# Patient Record
Sex: FEMALE | Race: WHITE | Employment: FULL TIME | URBAN - METROPOLITAN AREA
[De-identification: names, ages, dates, MRNs, and addresses within clinical notes are randomized per-mention and may not be internally consistent; named-entity substitution may affect disease eponyms.]

---

## 2017-01-09 ENCOUNTER — OFFICE VISIT (OUTPATIENT)
Dept: FAMILY MEDICINE | Facility: CLINIC | Age: 32
End: 2017-01-09
Payer: COMMERCIAL

## 2017-01-09 VITALS
TEMPERATURE: 99 F | DIASTOLIC BLOOD PRESSURE: 70 MMHG | SYSTOLIC BLOOD PRESSURE: 113 MMHG | OXYGEN SATURATION: 97 % | WEIGHT: 115.5 LBS | BODY MASS INDEX: 22.68 KG/M2 | HEART RATE: 67 BPM | HEIGHT: 60 IN

## 2017-01-09 DIAGNOSIS — N92.6 MISSED PERIOD: Primary | ICD-10-CM

## 2017-01-09 DIAGNOSIS — Z32.01 PREGNANCY TEST POSITIVE: ICD-10-CM

## 2017-01-09 DIAGNOSIS — Z30.09 ENCOUNTER FOR OTHER GENERAL COUNSELING OR ADVICE ON CONTRACEPTION: ICD-10-CM

## 2017-01-09 LAB — BETA HCG QUAL IFA URINE: POSITIVE

## 2017-01-09 PROCEDURE — 99213 OFFICE O/P EST LOW 20 MIN: CPT | Performed by: FAMILY MEDICINE

## 2017-01-09 PROCEDURE — 84703 CHORIONIC GONADOTROPIN ASSAY: CPT | Performed by: FAMILY MEDICINE

## 2017-01-09 ASSESSMENT — PAIN SCALES - GENERAL: PAINLEVEL: NO PAIN (0)

## 2017-01-09 NOTE — MR AVS SNAPSHOT
After Visit Summary   2017    Jenny Caldwell    MRN: [id number]           Patient Information     Date Of Birth          1985        Visit Information        Provider Department      2017 6:15 PM Alicia Bonilla MD [facility]        Today's Diagnoses     Missed period    -  1     Pregnancy test positive           Care Instructions     [facility] Offering  Services   Clinic     Gestation  Days Open   [facility]   4-22 weeks  Mon-Sat   [address] [place] [address]   [phone]     [facility]  5-21 weeks  Mon-Fri   [address], [address]   [place], [place] [address] [phone]     [facility]   6-12 weeks  Mon-Sat   [address] [place], [place] [address] [phone]     Dr. Maria D Tellez   5-21 weeks  Mon-Fri   [address] [facility] [address] [address]   [phone]     - Some insurance plans cover the cost of  services   - Fees vary with each clinic and the stage of pregnancy.   - At each of the above clinics, you will visit with a nurse or counselor before seeing the physician for the  procedure. They will review your health history, explain the procedure, aftercare, and the  consent form, and discuss any questions or concerns you may have about your decision, the procedure, and future contraception.   - A loan fund for women in need of financial services is available through [employer]. If you are eligible to receive money you will be expected to repay the loan within a reasonable amount of time. Financial Assistance Phone: [phone]     Pregnancy can be accompanied by a range of feelings for a woman as well as her partner. There are community organizations where a woman or couple can talk about this and get information, counseling, and support. It usually involves one or more sessions with a trained counselor who recognizes the complexity of a  situation and encourages your own decisions.     For questions about community resources in terms of your pregnancy you may call [facility] for a pregnancy counseling referral [phone]     Pregnancy and adoption information can be found at the following:   Children [employer] Home [employer]   [facility]   [address] [place]   [address] [facility]   [address] [facility]  Phone: [phone]   Phone: [phone]   Phone: [phone]         Follow-ups after your visit        Who to contact     If you have questions or need follow up information about today's clinic visit or your schedule please contact [facility] directly at [phone].  Normal or non-critical lab and imaging results will be communicated to you by MyChart, letter or phone within 4 business days after the clinic has received the results. If you do not hear from us within 7 days, please contact the clinic through MyChart or phone. If you have a critical or abnormal lab result, we will notify you by phone as soon as possible.  Submit refill requests through [employer] or call your pharmacy and they will forward the refill request to us. Please allow 3 business days for your refill to be completed.          Additional Information About Your Visit        CellScopehart Information     [employer] gives you secure access to your electronic health record. If you see a primary care provider, you can also send messages to your care team and make appointments. If you have questions, please call your primary care clinic.  If you do not have a primary care provider, please call [phone] and they will assist you.        Care EveryWhere ID     This is your Care EveryWhere ID. This could be used by other organizations to access your [place] medical records  [id number]        Your Vitals Were     Pulse Temperature Height BMI (Body Mass Index) Pulse Oximetry Last Period    67 99  F (37.2  " C) (Oral) 4' 11.84\" (1.52 m) 22.68 kg/m2 97% (LMP Unknown)       Blood Pressure from Last 3 Encounters:   01/09/17 113/70   10/20/16 98/80   06/22/16 112/79    Weight from Last 3 Encounters:   01/09/17 115 lb 8 oz (52.39 kg)   06/22/16 117 lb (53.071 kg)   06/06/16 117 lb (53.071 kg)              We Performed the Following     Beta HCG qual IFA urine        Primary Care Provider Office Phone # Fax #    Alicia Bonilla [phone]       [address] [address]        Thank you!     Thank you for choosing [facility]  for your care. Our goal is always to provide you with excellent care. Hearing back from our patients is one way we can continue to improve our services. Please take a few minutes to complete the written survey that you may receive in the mail after your visit with us. Thank you!             Your Updated Medication List - Protect others around you: Learn how to safely use, store and throw away your medicines at www.disposemymeds.org.          This list is accurate as of: 1/9/17  6:40 PM.  Always use your most recent med list.                   Brand Name Dispense Instructions for use    guaiFENesin-codeine 100-10 MG/5ML Soln solution    ROBITUSSIN AC    120 mL    Take 5 mLs by mouth every 4 hours as needed for cough       levonorgestrel-ethinyl estradiol 0.15-30 MG-MCG per tablet    DANY-28    84 tablet    Take 1 tablet by mouth daily         "

## 2017-01-10 NOTE — PATIENT INSTRUCTIONS
[facility] Offering  Services   Clinic     Gestation  Days Open   [facility]   4-22 weeks  Mon-Sat   [address] [place] [address]   [phone]     [facility]  5-21 weeks  Mon-Fri   [address]   [place] [address]   [phone]     [facility]   6-12 weeks  Mon-Sat   [address] [place] [address]   [phone]     Dr. Maria D Tellez   5-21 weeks  Mon-Fri   [address] [address]   [phone]     - Some insurance plans cover the cost of  services   - Fees vary with each clinic and the stage of pregnancy.   - At each of the above clinics, you will visit with a nurse or counselor before seeing the physician for the  procedure. They will review your health history, explain the procedure, aftercare, and the  consent form, and discuss any questions or concerns you may have about your decision, the procedure, and future contraception.   - A loan fund for women in need of financial services is available through [employer]. If you are eligible to receive money you will be expected to repay the loan within a reasonable amount of time. Financial Assistance Phone: [phone]     Pregnancy can be accompanied by a range of feelings for a woman as well as her partner. There are community organizations where a woman or couple can talk about this and get information, counseling, and support. It usually involves one or more sessions with a trained counselor who recognizes the complexity of a situation and encourages your own decisions.     For questions about community resources in terms of your pregnancy you may call [facility] for a pregnancy counseling referral [phone]     Pregnancy and adoption information can be found at the following:   Children s Home Society   [facility]    [facility]   [address] [place]    [address] [facility]   [address]  Phone: [phone]   Phone: [phone]   Phone: [phone]
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
## 2017-01-10 NOTE — NURSING NOTE
"Chief Complaint   Patient presents with     Confirmation Of Pregnancy       Initial /70 mmHg  Pulse 67  Temp(Src) 99  F (37.2  C) (Oral)  Ht 4' 11.84\" (1.52 m)  Wt 115 lb 8 oz (52.39 kg)  BMI 22.68 kg/m2  SpO2 97% Estimated body mass index is 22.68 kg/(m^2) as calculated from the following:    Height as of this encounter: 4' 11.84\" (1.52 m).    Weight as of this encounter: 115 lb 8 oz (52.39 kg).  BP completed using cuff size: regular    "

## 2017-04-17 ENCOUNTER — MYC MEDICAL ADVICE (OUTPATIENT)
Dept: FAMILY MEDICINE | Facility: CLINIC | Age: 32
End: 2017-04-17

## 2017-04-18 ENCOUNTER — MYC MEDICAL ADVICE (OUTPATIENT)
Dept: FAMILY MEDICINE | Facility: CLINIC | Age: 32
End: 2017-04-18

## 2017-04-19 NOTE — TELEPHONE ENCOUNTER
Spoke with patient she is having abdominal pain that comes and goes, denies dizziness.  Offered appointment with alternative provider.  Patient states she was told by PCP that she could be fit into PCP schedule when needed.  Advised patient provider is out this week, patient aware and would like to wait.   Lelia Rowland RN

## 2017-04-24 ENCOUNTER — OFFICE VISIT (OUTPATIENT)
Dept: FAMILY MEDICINE | Facility: CLINIC | Age: 32
End: 2017-04-24
Payer: COMMERCIAL

## 2017-04-24 VITALS
WEIGHT: 118.5 LBS | SYSTOLIC BLOOD PRESSURE: 109 MMHG | DIASTOLIC BLOOD PRESSURE: 73 MMHG | BODY MASS INDEX: 23.26 KG/M2 | HEART RATE: 74 BPM | TEMPERATURE: 98.5 F | OXYGEN SATURATION: 99 % | HEIGHT: 60 IN

## 2017-04-24 DIAGNOSIS — F43.22 ADJUSTMENT DISORDER WITH ANXIOUS MOOD: ICD-10-CM

## 2017-04-24 DIAGNOSIS — R10.84 ABDOMINAL PAIN, GENERALIZED: Primary | ICD-10-CM

## 2017-04-24 DIAGNOSIS — K59.00 CONSTIPATION, UNSPECIFIED CONSTIPATION TYPE: ICD-10-CM

## 2017-04-24 DIAGNOSIS — Z63.79 STRESSFUL LIFE EVENT AFFECTING FAMILY: ICD-10-CM

## 2017-04-24 LAB
BASOPHILS # BLD AUTO: 0 10E9/L (ref 0–0.2)
BASOPHILS NFR BLD AUTO: 0.3 %
DIFFERENTIAL METHOD BLD: NORMAL
EOSINOPHIL # BLD AUTO: 0.1 10E9/L (ref 0–0.7)
EOSINOPHIL NFR BLD AUTO: 0.9 %
ERYTHROCYTE [DISTWIDTH] IN BLOOD BY AUTOMATED COUNT: 12.4 % (ref 10–15)
ERYTHROCYTE [SEDIMENTATION RATE] IN BLOOD BY WESTERGREN METHOD: 9 MM/H (ref 0–20)
HCT VFR BLD AUTO: 42.1 % (ref 35–47)
HGB BLD-MCNC: 13.8 G/DL (ref 11.7–15.7)
LYMPHOCYTES # BLD AUTO: 2.7 10E9/L (ref 0.8–5.3)
LYMPHOCYTES NFR BLD AUTO: 41.6 %
MCH RBC QN AUTO: 32.2 PG (ref 26.5–33)
MCHC RBC AUTO-ENTMCNC: 32.8 G/DL (ref 31.5–36.5)
MCV RBC AUTO: 98 FL (ref 78–100)
MONOCYTES # BLD AUTO: 0.7 10E9/L (ref 0–1.3)
MONOCYTES NFR BLD AUTO: 11.1 %
NEUTROPHILS # BLD AUTO: 3 10E9/L (ref 1.6–8.3)
NEUTROPHILS NFR BLD AUTO: 46.1 %
PLATELET # BLD AUTO: 384 10E9/L (ref 150–450)
RBC # BLD AUTO: 4.28 10E12/L (ref 3.8–5.2)
WBC # BLD AUTO: 6.6 10E9/L (ref 4–11)

## 2017-04-24 PROCEDURE — 86140 C-REACTIVE PROTEIN: CPT | Performed by: FAMILY MEDICINE

## 2017-04-24 PROCEDURE — 99214 OFFICE O/P EST MOD 30 MIN: CPT | Performed by: FAMILY MEDICINE

## 2017-04-24 PROCEDURE — 85025 COMPLETE CBC W/AUTO DIFF WBC: CPT | Performed by: FAMILY MEDICINE

## 2017-04-24 PROCEDURE — 36415 COLL VENOUS BLD VENIPUNCTURE: CPT | Performed by: FAMILY MEDICINE

## 2017-04-24 PROCEDURE — 85652 RBC SED RATE AUTOMATED: CPT | Performed by: FAMILY MEDICINE

## 2017-04-24 PROCEDURE — 80053 COMPREHEN METABOLIC PANEL: CPT | Performed by: FAMILY MEDICINE

## 2017-04-24 PROCEDURE — 83690 ASSAY OF LIPASE: CPT | Performed by: FAMILY MEDICINE

## 2017-04-24 NOTE — NURSING NOTE
Chief Complaint   Patient presents with     Abdominal Pain       Initial /73  Pulse 74  Temp 98.5  F (36.9  C) (Oral)  Ht 5' (1.524 m)  Wt 118 lb 8 oz (53.8 kg)  LMP  (LMP Unknown)  SpO2 99%  BMI 23.14 kg/m2 Estimated body mass index is 23.14 kg/(m^2) as calculated from the following:    Height as of this encounter: 5' (1.524 m).    Weight as of this encounter: 118 lb 8 oz (53.8 kg).  Medication Reconciliation: complete       Awilda Steinberg, CMA

## 2017-04-24 NOTE — TELEPHONE ENCOUNTER
Called patient put her on Dr. Bonilla's schedule for today at 5:45 there was a cancellation.  Eileen Brown,

## 2017-04-24 NOTE — PATIENT INSTRUCTIONS
Call to schedule with the counselor  The  will call you    Plan: Magnesium Citrate 150ml or 1/2 bottle twice a day for 3 days.  MiraLax one capful in 8 ounces of water twice a day for 3 days and then MiraLax one capful in 8 ounces of water everyday in the evening or AM.  Keep stools soft and easy to pass  Can adjust MiraLax to 3/4 capful in 6 ounces of water or 1/2 capful in 4 ounces of water.or increase the Miralax to 2 times a day for a few days.    continue MiraLax daily until stools are soft and easy to pass for 2 months and then can wean off.  Go to every other day dosing for a couple of weeks then 2 doses per week for a couple of weeks and stop.   But if stools get hard again then continue on the MiraLax until stools are soft and easy to passand then wean off.  Needs to drink 64 ounces of fluids per day and 16 ounces only of these can be from milk.  Add 2 gummi fiber per day   [facility]    If you have any questions regarding to your visit please contact your care team:       Team Purple:   Clinic Hours Telephone Number   [address] Dr[address]   7am-7pm  Monday - Thursday   7am-5pm  Fridays  [phone]  (Appointment scheduling available 24/7)    Questions about your Visit?   Team Line:  [phone]   Urgent Care - [place] - 11am-9pm Monday-Friday Saturday-Sunday- 9am-5pm   [place] - 5pm-9pm Monday-Friday Saturday-Sunday- 9am-5pm  [phone] - [place]   [phone] - [place]       What options do I have for visits at the clinic other than the traditional office visit?  To expand how we care for you, many of our providers are utilizing electronic visits (e-visits) and telephone visits, when medically appropriate, for interactions with their patients rather than a visit in the clinic.   We also offer nurse visits for many medical concerns. Just like any other service, we will bill your  insurance company for this type of visit based on time spent on the phone with your provider. Not all insurance companies cover these visits. Please check with your medical insurance if this type of visit is covered. You will be responsible for any charges that are not paid by your insurance.      E-visits via [employer]:  generally incur a $35.00 fee.  Telephone visits:  Time spent on the phone: *charged based on time that is spent on the phone in increments of 10 minutes. Estimated cost:   5-10 mins $30.00   11-20 mins. $59.00   21-30 mins. $85.00     Use Swidjit (secure email communication and access to your chart) to send your primary care provider a message or make an appointment. Ask someone on your Team how to sign up for Swidjit.  For a Price Quote for your services, please call our Consumer Price Line at [phone].  As always, Thank you for trusting us with your health care needs!    Liliya Bryant MA
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
## 2017-04-24 NOTE — MR AVS SNAPSHOT
After Visit Summary   4/24/2017    Jenny Caldwell    MRN: [id number]           Patient Information     Date Of Birth          1985        Visit Information        Provider Department      4/24/2017 5:45 PM Alicia Bonilla MD [facility]        Today's Diagnoses     Abdominal pain, generalized    -  1    Adjustment disorder with anxious mood        Stressful life event affecting family        Constipation, unspecified constipation type          Care Instructions    Call to schedule with the counselor  The  will call you    Plan: Magnesium Citrate 150ml or 1/2 bottle twice a day for 3 days.  MiraLax one capful in 8 ounces of water twice a day for 3 days and then MiraLax one capful in 8 ounces of water everyday in the evening or AM.  Keep stools soft and easy to pass  Can adjust MiraLax to 3/4 capful in 6 ounces of water or 1/2 capful in 4 ounces of water.or increase the Miralax to 2 times a day for a few days.    continue MiraLax daily until stools are soft and easy to pass for 2 months and then can wean off.  Go to every other day dosing for a couple of weeks then 2 doses per week for a couple of weeks and stop.   But if stools get hard again then continue on the MiraLax until stools are soft and easy to passand then wean off.  Needs to drink 64 ounces of fluids per day and 16 ounces only of these can be from milk.  Add 2 gummi fiber per day   [facility]    If you have any questions regarding to your visit please contact your care team:       Team Purple:   Clinic Hours Telephone Number   [address] Dr.   7am-7pm  Monday - Thursday   7am-5pm  Fridays  [phone]  (Appointment scheduling available 24/7)    Questions about your Visit?   Team Line:  [phone]   Urgent Care - [place] - 11am-9pm Monday-Friday Saturday-Sunday- 9am-5pm   [place] - 5pm-9pm  Monday-Friday Saturday-Sunday- 9am-5pm  [phone] - [place]   [phone] - [place]       What options do I have for visits at the clinic other than the traditional office visit?  To expand how we care for you, many of our providers are utilizing electronic visits (e-visits) and telephone visits, when medically appropriate, for interactions with their patients rather than a visit in the clinic.   We also offer nurse visits for many medical concerns. Just like any other service, we will bill your insurance company for this type of visit based on time spent on the phone with your provider. Not all insurance companies cover these visits. Please check with your medical insurance if this type of visit is covered. You will be responsible for any charges that are not paid by your insurance.      E-visits via O[employer]X:  generally incur a $35.00 fee.  Telephone visits:  Time spent on the phone: *charged based on time that is spent on the phone in increments of 10 minutes. Estimated cost:   5-10 mins $30.00   11-20 mins. $59.00   21-30 mins. $85.00     Use OSIX (secure email communication and access to your chart) to send your primary care provider a message or make an appointment. Ask someone on your Team how to sign up for [employer]SIX.  For a Price Quote for your services, please call our Consumer Price Line at [phone].  As always, Thank you for trusting us with your health care needs!    Liliya Bryant MA          Follow-ups after your visit        Additional Services     CARE COORDINATION REFERRAL       Services are provided by a Care Coordinator for people with complex needs such as: medical, social, or financial troubles.  The Care Coordinator works with the patient and their Primary Care Provider to determine health goals, obtain resources, achieve outcomes, and develop care plans that help coordinate the patient's care.     Reason for Referral: Mental Status/Behavioral Changes and Other: custody and child  support issues    Provide additional details for Care Coordination to best meet the patient's current needs: will discuss with Leroy tomorrow    Clinical Staff have discussed the Care Coordination Referral with the patient and/or caregiver: yes            MENTAL HEALTH REFERRAL       Your provider has referred you to: MUSHTAQ: [facility] - [facility]ing (Individual/Couples/Family) - [facility], [place] [phone]   [email or web address]/   *Patient will be contacted by [place]'s scheduling partner, [facility]), to schedule an appointment.  Patients may also call [facility] to schedule.    All scheduling is subject to the client's specific insurance plan & benefits, provider/location availability, and provider clinical specialities.  Please arrive 15 minutes early for your first appointment and bring your completed paperwork.    Please be aware that coverage of these services is subject to the terms and limitations of your health insurance plan.  Call member services at your health plan with any benefit or coverage questions.                  Future tests that were ordered for you today     Open Future Orders        Priority Expected Expires Ordered    H Pylori antigen, stool Routine  5/24/2017 4/24/2017            Who to contact     If you have questions or need follow up information about today's clinic visit or your schedule please contact [facility] directly at [phone].  Normal or non-critical lab and imaging results will be communicated to you by MyChart, letter or phone within 4 business days after the clinic has received the results. If you do not hear from us within 7 days, please contact the clinic through MyChart or phone. If you have a critical or abnormal lab result, we will notify you by phone as soon as possible.  Submit refill requests through [email or web address] or call your pharmacy and they will forward  the refill request to us. Please allow 3 business days for your refill to be completed.          Additional Information About Your Visit        [employer] Information     [employer] gives you secure access to your electronic health record. If you see a primary care provider, you can also send messages to your care team and make appointments. If you have questions, please call your primary care clinic.  If you do not have a primary care provider, please call [phone] and they will assist you.        Care EveryWhere ID     This is your Care EveryWhere ID. This could be used by other organizations to access your [place] medical records  [id number]        Your Vitals Were     Pulse Temperature Height Last Period Pulse Oximetry BMI (Body Mass Index)    74 98.5  F (36.9  C) (Oral) 5' (1.524 m) (LMP Unknown) 99% 23.14 kg/m2       Blood Pressure from Last 3 Encounters:   04/24/17 109/73   01/09/17 113/70   10/20/16 98/80    Weight from Last 3 Encounters:   04/24/17 118 lb 8 oz (53.8 kg)   01/09/17 115 lb 8 oz (52.4 kg)   06/22/16 117 lb (53.1 kg)              We Performed the Following     CARE COORDINATION REFERRAL     CBC with platelets and differential     Comprehensive metabolic panel     CRP, inflammation     ESR: Erythrocyte sedimentation rate     Lipase     MENTAL HEALTH REFERRAL        Primary Care Provider Office Phone # Fax #    Alicia Bonilla [phone]       [address] [address]        Thank you!     Thank you for choosing [facility]  for your care. Our goal is always to provide you with excellent care. Hearing back from our patients is one way we can continue to improve our services. Please take a few minutes to complete the written survey that you may receive in the mail after your visit with us. Thank you!             Your Updated Medication List - Protect others around you: Learn how to safely use, store and throw away your  medicines at www.disposemymeds.org.          This list is accurate as of: 4/24/17  6:42 PM.  Always use your most recent med list.                   Brand Name Dispense Instructions for use    guaiFENesin-codeine 100-10 MG/5ML Soln solution    ROBITUSSIN AC    120 mL    Take 5 mLs by mouth every 4 hours as needed for cough       levonorgestrel-ethinyl estradiol 0.15-30 MG-MCG per tablet    DANY-28    84 tablet    Take 1 tablet by mouth daily

## 2017-04-25 DIAGNOSIS — R10.84 ABDOMINAL PAIN, GENERALIZED: ICD-10-CM

## 2017-04-25 LAB
ALBUMIN SERPL-MCNC: 4.1 G/DL (ref 3.4–5)
ALP SERPL-CCNC: 62 U/L (ref 40–150)
ALT SERPL W P-5'-P-CCNC: 18 U/L (ref 0–50)
ANION GAP SERPL CALCULATED.3IONS-SCNC: 6 MMOL/L (ref 3–14)
AST SERPL W P-5'-P-CCNC: 15 U/L (ref 0–45)
BILIRUB SERPL-MCNC: 0.4 MG/DL (ref 0.2–1.3)
BUN SERPL-MCNC: 13 MG/DL (ref 7–30)
CALCIUM SERPL-MCNC: 9.4 MG/DL (ref 8.5–10.1)
CHLORIDE SERPL-SCNC: 109 MMOL/L (ref 94–109)
CO2 SERPL-SCNC: 26 MMOL/L (ref 20–32)
CREAT SERPL-MCNC: 0.62 MG/DL (ref 0.52–1.04)
CRP SERPL-MCNC: <2.9 MG/L (ref 0–8)
GFR SERPL CREATININE-BSD FRML MDRD: NORMAL ML/MIN/1.7M2
GLUCOSE SERPL-MCNC: 85 MG/DL (ref 70–99)
LIPASE SERPL-CCNC: 193 U/L (ref 73–393)
POTASSIUM SERPL-SCNC: 4.2 MMOL/L (ref 3.4–5.3)
PROT SERPL-MCNC: 7.5 G/DL (ref 6.8–8.8)
SODIUM SERPL-SCNC: 141 MMOL/L (ref 133–144)

## 2017-04-25 PROCEDURE — 87338 HPYLORI STOOL AG IA: CPT | Performed by: FAMILY MEDICINE

## 2017-04-26 LAB
H PYLORI AG STL QL IA: NORMAL
MICRO REPORT STATUS: NORMAL
SPECIMEN SOURCE: NORMAL

## 2017-05-02 ENCOUNTER — CARE COORDINATION (OUTPATIENT)
Dept: CARE COORDINATION | Facility: CLINIC | Age: 32
End: 2017-05-02

## 2017-05-02 NOTE — PROGRESS NOTES
Clinic Care Coordination Contact  Care Team Conversations    SW followed up with pt referral placed by PCP. Pt described feeling overwhelmed due father of her children initiating a custody order. Pt has legal resources and is pursuing them in order to assist with her custody issue.   Pt currently declining CC as she feels she has sufficient support for her legal concerns.    Pt will contact CC if other needs arise. SW will not schedule any future outreach.    YANY Padilla  Care Coordination  [place]  [phone]  [email or web address]

## 2017-09-26 NOTE — PATIENT INSTRUCTIONS
Preventive Health Recommendations  Female Ages 26 - 39  Yearly exam:   See your health care provider every year in order to    Review health changes.     Discuss preventive care.      Review your medicines if you your doctor has prescribed any.    Until age 30: Get a Pap test every three years (more often if you have had an abnormal result).    After age 30: Talk to your doctor about whether you should have a Pap test every 3 years or have a Pap test with HPV screening every 5 years.   You do not need a Pap test if your uterus was removed (hysterectomy) and you have not had cancer.  You should be tested each year for STDs (sexually transmitted diseases), if you're at risk.   Talk to your provider about how often to have your cholesterol checked.  If you are at risk for diabetes, you should have a diabetes test (fasting glucose).  Shots: Get a flu shot each year. Get a tetanus shot every 10 years.   Nutrition:     Eat at least 5 servings of fruits and vegetables each day.    Eat whole-grain bread, whole-wheat pasta and brown rice instead of white grains and rice.    Talk to your provider about Calcium and Vitamin D.     Lifestyle    Exercise at least 150 minutes a week (30 minutes a day, 5 days of the week). This will help you control your weight and prevent disease.    Limit alcohol to one drink per day.    No smoking.     Wear sunscreen to prevent skin cancer.    See your dentist every six months for an exam and cleaning.    [facility]    If you have any questions regarding to your visit please contact your care team:       Team Purple:   Clinic Hours Telephone Number   Dr. Leslie Bonilla     7am-7pm  Monday - Thursday   7am-5pm  Fridays  [phone]  (Appointment scheduling available 24/7)    Questions about your Visit?   Team Line:  [phone]   Urgent Care - [place] and [place] - 11am-9pm Monday-Friday Saturday-Sunday- 9am-5pm    [place] - 5pm-9pm Monday-Friday Saturday-Sunday- 9am-5pm  [phone] - [place]   [phone] - [place]       What options do I have for visits at the clinic other than the traditional office visit?  To expand how we care for you, many of our providers are utilizing electronic visits (e-visits) and telephone visits, when medically appropriate, for interactions with their patients rather than a visit in the clinic.   We also offer nurse visits for many medical concerns. Just like any other service, we will bill your insurance company for this type of visit based on time spent on the phone with your provider. Not all insurance companies cover these visits. Please check with your medical insurance if this type of visit is covered. You will be responsible for any charges that are not paid by your insurance.      E-visits via "[employer]":  generally incur a $35.00 fee.  Telephone visits:  Time spent on the phone: *charged based on time that is spent on the phone in increments of 10 minutes. Estimated cost:   5-10 mins $30.00   11-20 mins. $59.00   21-30 mins. $85.00     Use "[employer]" (secure email communication and access to your chart) to send your primary care provider a message or make an appointment. Ask someone on your Team how to sign up for "[employer]  For a Price Quote for your services, please call our Consumer Price Line at [phone].  As always, Thank you for trusting us with your health care needs!

## 2017-09-27 ENCOUNTER — OFFICE VISIT (OUTPATIENT)
Dept: FAMILY MEDICINE | Facility: CLINIC | Age: 32
End: 2017-09-27
Payer: MEDICAID

## 2017-09-27 VITALS
WEIGHT: 119.5 LBS | BODY MASS INDEX: 23.46 KG/M2 | SYSTOLIC BLOOD PRESSURE: 104 MMHG | TEMPERATURE: 99 F | HEART RATE: 83 BPM | DIASTOLIC BLOOD PRESSURE: 68 MMHG | OXYGEN SATURATION: 99 % | HEIGHT: 60 IN

## 2017-09-27 DIAGNOSIS — Z12.4 SCREENING FOR MALIGNANT NEOPLASM OF CERVIX: ICD-10-CM

## 2017-09-27 DIAGNOSIS — Z90.81 S/P SPLENECTOMY: ICD-10-CM

## 2017-09-27 DIAGNOSIS — Z23 NEED FOR PROPHYLACTIC VACCINATION AND INOCULATION AGAINST INFLUENZA: ICD-10-CM

## 2017-09-27 DIAGNOSIS — Z30.09 STERILIZATION CONSULT: ICD-10-CM

## 2017-09-27 DIAGNOSIS — Z80.3 FAMILY HISTORY OF BREAST CANCER: ICD-10-CM

## 2017-09-27 DIAGNOSIS — Z00.00 ENCOUNTER FOR ROUTINE ADULT HEALTH EXAMINATION WITHOUT ABNORMAL FINDINGS: Primary | ICD-10-CM

## 2017-09-27 DIAGNOSIS — Z30.41 SURVEILLANCE OF PREVIOUSLY PRESCRIBED CONTRACEPTIVE PILL: ICD-10-CM

## 2017-09-27 DIAGNOSIS — Z12.31 ENCOUNTER FOR SCREENING MAMMOGRAM FOR BREAST CANCER: ICD-10-CM

## 2017-09-27 PROCEDURE — 90734 MENACWYD/MENACWYCRM VACC IM: CPT | Performed by: FAMILY MEDICINE

## 2017-09-27 PROCEDURE — 90686 IIV4 VACC NO PRSV 0.5 ML IM: CPT | Performed by: FAMILY MEDICINE

## 2017-09-27 PROCEDURE — 99395 PREV VISIT EST AGE 18-39: CPT | Mod: 25 | Performed by: FAMILY MEDICINE

## 2017-09-27 PROCEDURE — G0476 HPV COMBO ASSAY CA SCREEN: HCPCS | Performed by: FAMILY MEDICINE

## 2017-09-27 PROCEDURE — 90472 IMMUNIZATION ADMIN EACH ADD: CPT | Performed by: FAMILY MEDICINE

## 2017-09-27 PROCEDURE — G0145 SCR C/V CYTO,THINLAYER,RESCR: HCPCS | Performed by: FAMILY MEDICINE

## 2017-09-27 PROCEDURE — 87624 HPV HI-RISK TYP POOLED RSLT: CPT | Performed by: FAMILY MEDICINE

## 2017-09-27 PROCEDURE — 90471 IMMUNIZATION ADMIN: CPT | Performed by: FAMILY MEDICINE

## 2017-09-27 RX ORDER — NORGESTIMATE AND ETHINYL ESTRADIOL 7DAYSX3 28
1 KIT ORAL DAILY
Qty: 84 TABLET | Refills: 4 | Status: SHIPPED | OUTPATIENT
Start: 2017-09-27 | End: 2018-09-18

## 2017-09-27 NOTE — NURSING NOTE
Chief Complaint   Patient presents with     Physical     discuss birth control     Flu Shot       Initial /68 (BP Location: Right arm, Patient Position: Chair, Cuff Size: Adult Regular)  Pulse 83  Temp 99  F (37.2  C)  Ht 5' (1.524 m)  Wt 119 lb 8 oz (54.2 kg)  LMP 09/25/2017  SpO2 99%  BMI 23.34 kg/m2 Estimated body mass index is 23.34 kg/(m^2) as calculated from the following:    Height as of this encounter: 5' (1.524 m).    Weight as of this encounter: 119 lb 8 oz (54.2 kg).  Medication Reconciliation: complete   Molly Ayala MA

## 2017-09-27 NOTE — MR AVS SNAPSHOT
After Visit Summary   9/27/2017    Jenny Caldwell    MRN: [id number]           Patient Information     Date Of Birth          1985        Visit Information        Provider Department      9/27/2017 11:00 AM Leslie Villar MD [facility]        Today's Diagnoses     Encounter for routine adult health examination without abnormal findings    -  1    Screening for malignant neoplasm of cervix        Surveillance of previously prescribed contraceptive pill        Encounter for screening mammogram for breast cancer        Sterilization consult        Family history of breast cancer        Need for prophylactic vaccination and inoculation against influenza          Care Instructions      Preventive Health Recommendations  Female Ages 26 - 39  Yearly exam:   See your health care provider every year in order to    Review health changes.     Discuss preventive care.      Review your medicines if you your doctor has prescribed any.    Until age 30: Get a Pap test every three years (more often if you have had an abnormal result).    After age 30: Talk to your doctor about whether you should have a Pap test every 3 years or have a Pap test with HPV screening every 5 years.   You do not need a Pap test if your uterus was removed (hysterectomy) and you have not had cancer.  You should be tested each year for STDs (sexually transmitted diseases), if you're at risk.   Talk to your provider about how often to have your cholesterol checked.  If you are at risk for diabetes, you should have a diabetes test (fasting glucose).  Shots: Get a flu shot each year. Get a tetanus shot every 10 years.   Nutrition:     Eat at least 5 servings of fruits and vegetables each day.    Eat whole-grain bread, whole-wheat pasta and brown rice instead of white grains and rice.    Talk to your provider about Calcium and Vitamin D.     Lifestyle    Exercise at least 150 minutes a week (30 minutes a day, 5 days of  the week). This will help you control your weight and prevent disease.    Limit alcohol to one drink per day.    No smoking.     Wear sunscreen to prevent skin cancer.    See your dentist every six months for an exam and cleaning.    [facility]    If you have any questions regarding to your visit please contact your care team:       Team Purple:   Clinic Hours Telephone Number   Dr. Leslie Bonilla     7am-7pm  Monday - Thursday   7am-5pm  Fridays  [phone]  (Appointment scheduling available 24/7)    Questions about your Visit?   Team Line:  [phone]   Urgent Care - [place] and [facility] - 11am-9pm Monday-Friday Saturday-Sunday- 9am-5pm   [place] - 5pm-9pm Monday-Friday Saturday-Sunday- 9am-5pm  [phone] - [place]   [phone] - [place]       What options do I have for visits at the clinic other than the traditional office visit?  To expand how we care for you, many of our providers are utilizing electronic visits (e-visits) and telephone visits, when medically appropriate, for interactions with their patients rather than a visit in the clinic.   We also offer nurse visits for many medical concerns. Just like any other service, we will bill your insurance company for this type of visit based on time spent on the phone with your provider. Not all insurance companies cover these visits. Please check with your medical insurance if this type of visit is covered. You will be responsible for any charges that are not paid by your insurance.      E-visits via [email or web address]:  generally incur a $35.00 fee.  Telephone visits:  Time spent on the phone: *charged based on time that is spent on the phone in increments of 10 minutes. Estimated cost:   5-10 mins $30.00   11-20 mins. $59.00   21-30 mins. $85.00     Use [email or web address] (secure email communication and access to your chart) to send your primary care provider a message or make an appointment.  Ask someone on your Team how to sign up for [employer].  For a Price Quote for your services, please call our Consumer Price Line at [phone].  As always, Thank you for trusting us with your health care needs!                Follow-ups after your visit        Additional Services     OB/GYN REFERRAL       Your provider has referred you to:  MUSHTAQ: [facility] [place] [phone]   [email or web address]    Please be aware that coverage of these services is subject to the terms and limitations of your health insurance plan.  Call member services at your health plan with any benefit or coverage questions.      Please bring the following to your appointment:  >>   Any x-rays, CTs or MRIs which have been performed.  Contact the facility where they were done to arrange for  prior to your scheduled appointment.  Any new CT, MRI or other procedures ordered by your specialist must be performed at a [place] facility or coordinated by your clinic's referral office.    >>   List of current medications   >>   This referral request   >>   Any documents/labs given to you for this referral                  Future tests that were ordered for you today     Open Future Orders        Priority Expected Expires Ordered    MA Screening Digital Bilateral Routine  12/26/2017 9/27/2017            Who to contact     If you have questions or need follow up information about today's clinic visit or your schedule please contact [facility] directly at [phone].  Normal or non-critical lab and imaging results will be communicated to you by MyChart, letter or phone within 4 business days after the clinic has received the results. If you do not hear from us within 7 days, please contact the clinic through MyChart or phone. If you have a critical or abnormal lab result, we will notify you by phone as soon as possible.  Submit refill requests through [employer] or call your pharmacy and they  will forward the refill request to us. Please allow 3 business days for your refill to be completed.          Additional Information About Your Visit        [employer] Information     [employer] gives you secure access to your electronic health record. If you see a primary care provider, you can also send messages to your care team and make appointments. If you have questions, please call your primary care clinic.  If you do not have a primary care provider, please call [phone] and they will assist you.        Care EveryWhere ID     This is your Care EveryWhere ID. This could be used by other organizations to access your [place] medical records  [id number]        Your Vitals Were     Pulse Temperature Height Last Period Pulse Oximetry BMI (Body Mass Index)    83 99  F (37.2  C) 5' (1.524 m) 09/25/2017 99% 23.34 kg/m2       Blood Pressure from Last 3 Encounters:   09/27/17 104/68   04/24/17 109/73   01/09/17 113/70    Weight from Last 3 Encounters:   09/27/17 119 lb 8 oz (54.2 kg)   04/24/17 118 lb 8 oz (53.8 kg)   01/09/17 115 lb 8 oz (52.4 kg)              We Performed the Following     FLU VAC, SPLIT VIRUS IM > 3 YO (QUADRIVALENT) [82170]     HPV High Risk Types DNA Cervical     OB/GYN REFERRAL     Pap imaged thin layer screen with HPV - recommended age 30 - 65 years (select HPV order below)     Vaccine Administration, Initial [60684]          Today's Medication Changes          These changes are accurate as of: 9/27/17 11:38 AM.  If you have any questions, ask your nurse or doctor.               Start taking these medicines.        Dose/Directions    norgestim-eth estrad triphasic 0.18/0.215/0.25 MG-35 MCG per tablet   Commonly known as:  TRINESSA (28)   Used for:  Surveillance of previously prescribed contraceptive pill   Started by:  Leslie Villar MD        Dose:  1 tablet   Take 1 tablet by mouth daily   Quantity:  84 tablet   Refills:  4            Where to get your medicines      These medications  were sent to [facility] [place] [address]  [address] [place] [address]     Phone:  [phone]     norgestim-eth estrad triphasic 0.18/0.215/0.25 MG-35 MCG per tablet                Primary Care Provider Office Phone # Fax #    Alicia Bonilla [phone] [address] [address]        Equal Access to Services     [facility] Hospital[facility] : Hadii aad ku hadasho Soomaali, waaxda luqadaha, qaybta kaalmada adeegyada, waxay idiin hayaan talib waite . So [facility] [phone].    ATENCIÓN: Si habla español, tiene a mcconnell disposición servicios gratuitos de asistencia lingüística. [facility] [phone].    We comply with applicable federal civil rights laws and [place] laws. We do not discriminate on the basis of race, color, national origin, age, disability sex, sexual orientation or gender identity.            Thank you!     Thank you for choosing [facility]  for your care. Our goal is always to provide you with excellent care. Hearing back from our patients is one way we can continue to improve our services. Please take a few minutes to complete the written survey that you may receive in the mail after your visit with us. Thank you!             Your Updated Medication List - Protect others around you: Learn how to safely use, store and throw away your medicines at www.disposemymeds.org.          This list is accurate as of: 9/27/17 11:38 AM.  Always use your most recent med list.                   Brand Name Dispense Instructions for use Diagnosis    norgestim-eth estrad triphasic 0.18/0.215/0.25 MG-35 MCG per tablet    TRINESSA (28)    84 tablet    Take 1 tablet by mouth daily    Surveillance of previously prescribed contraceptive pill
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
## 2017-10-02 LAB
COPATH REPORT: ABNORMAL
PAP: ABNORMAL

## 2017-10-03 LAB
FINAL DIAGNOSIS: NORMAL
HPV HR 12 DNA CVX QL NAA+PROBE: NEGATIVE
HPV16 DNA SPEC QL NAA+PROBE: NEGATIVE
HPV18 DNA SPEC QL NAA+PROBE: NEGATIVE
SPECIMEN DESCRIPTION: NORMAL

## 2017-10-06 ENCOUNTER — RADIANT APPOINTMENT (OUTPATIENT)
Dept: MAMMOGRAPHY | Facility: CLINIC | Age: 32
End: 2017-10-06
Attending: FAMILY MEDICINE
Payer: COMMERCIAL

## 2017-10-06 DIAGNOSIS — Z12.31 VISIT FOR SCREENING MAMMOGRAM: ICD-10-CM

## 2017-10-06 PROCEDURE — G0202 SCR MAMMO BI INCL CAD: HCPCS | Mod: TC

## 2018-02-26 ENCOUNTER — TELEPHONE (OUTPATIENT)
Dept: FAMILY MEDICINE | Facility: CLINIC | Age: 33
End: 2018-02-26

## 2018-02-27 NOTE — TELEPHONE ENCOUNTER
Detailed VM left on patients phone regarding prescription sent to [facility] pharmacy. Advised to call back with questions, [phone].    Evelin Mann RN

## 2018-02-27 NOTE — TELEPHONE ENCOUNTER
Spoke with pharmacy and confirmed that there are refills left, RN asked to have pharmacy fill 1 refill and RN will call patient to let then know medication is available.     norgestim-eth estrad triphasic (TRINESSA, 28,) 0.18/0.215/0.25 MG-35 MCG per tablet 84 tablet 4 9/27/2017  --   Sig: Take 1 tablet by mouth daily   Class: E-Prescribe   Route: Oral   Order: [id number]   E-Prescribing Status: Receipt confirmed by pharmacy (9/27/2017 11:29 AM CDT)   Printout Tracking   External Result Report   Pharmacy   [facility] [address] [place] [address]     Evelin Mann RN

## 2018-02-27 NOTE — TELEPHONE ENCOUNTER
Pt states she is out of birth control pills-- no refills  Was given a 1 year supply on 9-    Please call her pharmacy-- determine what is needed. She should have plenty of refills.  Alicia Bonilla MD

## 2018-06-15 ENCOUNTER — TELEPHONE (OUTPATIENT)
Dept: FAMILY MEDICINE | Facility: CLINIC | Age: 33
End: 2018-06-15

## 2018-06-15 NOTE — TELEPHONE ENCOUNTER
Patient should have refills to last to her appt. She will call her pharmacy to check with them, and have them request a refill if needed.    Stas Huffman RN

## 2018-06-15 NOTE — TELEPHONE ENCOUNTER
Reason for Call:  Medication or medication refill:    Do you use a [facility]?  Name of the pharmacy and phone number for the current request:      Name of the medication requested: norgestim-eth estrad triphasic (TRINESSA, 28,) 0.18/0.215/0.25 MG-35 MCG per tablet    Other request: Patient called stating she needs refill for her birth control . She has a physical scheduled for Sept     Can we leave a detailed message on this number? YES    Phone number patient can be reached at: Home number on file [phone] (home)    Best Time: anytime    Call taken on 6/15/2018 at 1:39 PM by Kristina Younger

## 2018-09-18 ENCOUNTER — OFFICE VISIT (OUTPATIENT)
Dept: FAMILY MEDICINE | Facility: CLINIC | Age: 33
End: 2018-09-18
Payer: COMMERCIAL

## 2018-09-18 ENCOUNTER — MYC REFILL (OUTPATIENT)
Dept: FAMILY MEDICINE | Facility: CLINIC | Age: 33
End: 2018-09-18

## 2018-09-18 VITALS
BODY MASS INDEX: 26.31 KG/M2 | TEMPERATURE: 98.1 F | HEIGHT: 60 IN | HEART RATE: 60 BPM | SYSTOLIC BLOOD PRESSURE: 98 MMHG | DIASTOLIC BLOOD PRESSURE: 78 MMHG | WEIGHT: 134 LBS

## 2018-09-18 DIAGNOSIS — R10.12 LEFT UPPER QUADRANT PAIN: ICD-10-CM

## 2018-09-18 DIAGNOSIS — Z80.3 FAMILY HISTORY OF BREAST CANCER: ICD-10-CM

## 2018-09-18 DIAGNOSIS — Z30.41 SURVEILLANCE OF PREVIOUSLY PRESCRIBED CONTRACEPTIVE PILL: ICD-10-CM

## 2018-09-18 DIAGNOSIS — Z30.09 GENERAL COUNSELING AND ADVICE ON FEMALE CONTRACEPTION: ICD-10-CM

## 2018-09-18 DIAGNOSIS — Z00.01 ENCOUNTER FOR ROUTINE ADULT MEDICAL EXAM WITH ABNORMAL FINDINGS: Primary | ICD-10-CM

## 2018-09-18 PROCEDURE — 36415 COLL VENOUS BLD VENIPUNCTURE: CPT | Performed by: PHYSICIAN ASSISTANT

## 2018-09-18 PROCEDURE — 99395 PREV VISIT EST AGE 18-39: CPT | Performed by: PHYSICIAN ASSISTANT

## 2018-09-18 PROCEDURE — 80061 LIPID PANEL: CPT | Performed by: PHYSICIAN ASSISTANT

## 2018-09-18 RX ORDER — NORGESTIMATE AND ETHINYL ESTRADIOL 7DAYSX3 28
1 KIT ORAL DAILY
Qty: 84 TABLET | Refills: 4 | Status: CANCELLED | OUTPATIENT
Start: 2018-09-18

## 2018-09-18 RX ORDER — NORGESTIMATE AND ETHINYL ESTRADIOL 7DAYSX3 28
1 KIT ORAL DAILY
Qty: 84 TABLET | Refills: 4 | Status: SHIPPED | OUTPATIENT
Start: 2018-09-18 | End: 2021-11-05

## 2018-09-18 ASSESSMENT — ENCOUNTER SYMPTOMS
DIZZINESS: 0
HEARTBURN: 0
DIARRHEA: 0
WEAKNESS: 0
COUGH: 0
PARESTHESIAS: 0
ABDOMINAL PAIN: 0
FREQUENCY: 0
HEMATURIA: 0
FEVER: 0
SORE THROAT: 0
HEADACHES: 0
DYSURIA: 0
ARTHRALGIAS: 0
CHILLS: 0
PALPITATIONS: 0
BREAST MASS: 0
EYE PAIN: 0
NAUSEA: 0
CONSTIPATION: 0
JOINT SWELLING: 0
NERVOUS/ANXIOUS: 0
SHORTNESS OF BREATH: 0
HEMATOCHEZIA: 0

## 2018-09-18 NOTE — PATIENT INSTRUCTIONS
Siena or her team will be in touch with you with results of today's labs.  Call [facility] to schedule appointment for imaging [phone].    Call the clinic to schedule with OBGYN to consult regarding tubal ligation.    Siena Ureña PA-C    Preventive Health Recommendations  Female Ages 26 - 39  Yearly exam:   See your health care provider every year in order to    Review health changes.     Discuss preventive care.      Review your medicines if you your doctor has prescribed any.    Until age 30: Get a Pap test every three years (more often if you have had an abnormal result).    After age 30: Talk to your doctor about whether you should have a Pap test every 3 years or have a Pap test with HPV screening every 5 years.   You do not need a Pap test if your uterus was removed (hysterectomy) and you have not had cancer.  You should be tested each year for STDs (sexually transmitted diseases), if you're at risk.   Talk to your provider about how often to have your cholesterol checked.  If you are at risk for diabetes, you should have a diabetes test (fasting glucose).  Shots: Get a flu shot each year. Get a tetanus shot every 10 years.   Nutrition:     Eat at least 5 servings of fruits and vegetables each day.    Eat whole-grain bread, whole-wheat pasta and brown rice instead of white grains and rice.    Get adequate Calcium and Vitamin D.     Lifestyle    Exercise at least 150 minutes a week (30 minutes a day, 5 days of the week). This will help you control your weight and prevent disease.    Limit alcohol to one drink per day.    No smoking.     Wear sunscreen to prevent skin cancer.    See your dentist every six months for an exam and cleaning.

## 2018-09-18 NOTE — MR AVS SNAPSHOT
After Visit Summary   9/18/2018    Jenny Caldwell    MRN: [id number]           Patient Information     Date Of Birth          1985        Visit Information        Provider Department      9/18/2018 10:00 AM Siena Uerña PA-C [facility]        Today's Diagnoses     Encounter for routine adult medical exam with abnormal findings    -  1    Surveillance of previously prescribed contraceptive pill        Family history of breast cancer        General counseling and advice on female contraception          Care Instructions    Siena or her team will be in touch with you with results of today's labs.  Call [facility] to schedule appointment for imaging [phone].    Call the clinic to schedule with OBGYN to consult regarding tubal ligation.    Siena Ureña PA-C    Preventive Health Recommendations  Female Ages 26 - 39  Yearly exam:   See your health care provider every year in order to    Review health changes.     Discuss preventive care.      Review your medicines if you your doctor has prescribed any.    Until age 30: Get a Pap test every three years (more often if you have had an abnormal result).    After age 30: Talk to your doctor about whether you should have a Pap test every 3 years or have a Pap test with HPV screening every 5 years.   You do not need a Pap test if your uterus was removed (hysterectomy) and you have not had cancer.  You should be tested each year for STDs (sexually transmitted diseases), if you're at risk.   Talk to your provider about how often to have your cholesterol checked.  If you are at risk for diabetes, you should have a diabetes test (fasting glucose).  Shots: Get a flu shot each year. Get a tetanus shot every 10 years.   Nutrition:     Eat at least 5 servings of fruits and vegetables each day.    Eat whole-grain bread, whole-wheat pasta and brown rice instead of white grains and rice.    Get adequate Calcium and  Vitamin D.     Lifestyle    Exercise at least 150 minutes a week (30 minutes a day, 5 days of the week). This will help you control your weight and prevent disease.    Limit alcohol to one drink per day.    No smoking.     Wear sunscreen to prevent skin cancer.    See your dentist every six months for an exam and cleaning.            Follow-ups after your visit        Additional Services     OB/GYN REFERRAL       Your provider has referred you to:  [facility]: [facility] [phone]   [email or web address]    Please be aware that coverage of these services is subject to the terms and limitations of your health insurance plan.  Call member services at your health plan with any benefit or coverage questions.      Please bring the following with you to your appointment:    (1) Any X-Rays, CTs or MRIs which have been performed.  Contact the facility where they were done to arrange for  prior to your scheduled appointment.   (2) List of current medications   (3) This referral request   (4) Any documents/labs given to you for this referral                  Follow-up notes from your care team     Return in about 1 year (around 9/18/2019), or if symptoms worsen or fail to improve, for Physical Exam.      Future tests that were ordered for you today     Open Future Orders        Priority Expected Expires Ordered    MA Screen Bilateral w/Jaspal Routine  9/18/2019 9/18/2018            Who to contact     If you have questions or need follow up information about today's clinic visit or your schedule please contact [facility] directly at [phone].  Normal or non-critical lab and imaging results will be communicated to you by MyChart, letter or phone within 4 business days after the clinic has received the results. If you do not hear from us within 7 days, please contact the clinic through MyChart or phone. If you have a critical or abnormal lab  result, we will notify you by phone as soon as possible.  Submit refill requests through [employer] or call your pharmacy and they will forward the refill request to us. Please allow 3 business days for your refill to be completed.          Additional Information About Your Visit        [employer] Information     [employer] gives you secure access to your electronic health record. If you see a primary care provider, you can also send messages to your care team and make appointments. If you have questions, please call your primary care clinic.  If you do not have a primary care provider, please call [phone] and they will assist you.        Care EveryWhere ID     This is your Care EveryWhere ID. This could be used by other organizations to access your [facility] medical records  [id number]        Your Vitals Were     Pulse Temperature Height Last Period BMI (Body Mass Index)       60 98.1  F (36.7  C) (Oral) 5' (1.524 m) 09/09/2018 (Approximate) 26.17 kg/m2        Blood Pressure from Last 3 Encounters:   09/18/18 98/78   09/27/17 104/68   04/24/17 109/73    Weight from Last 3 Encounters:   09/18/18 134 lb (60.8 kg)   09/27/17 119 lb 8 oz (54.2 kg)   04/24/17 118 lb 8 oz (53.8 kg)              We Performed the Following     Lipid panel reflex to direct LDL Fasting     OB/GYN REFERRAL          Where to get your medicines      These medications were sent to [facility] [address] [place] [address]  [address] [place] [address]     Phone:  [phone]     norgestim-eth estrad triphasic 0.18/0.215/0.25 MG-35 MCG per tablet          Primary Care Provider Office Phone # Fax #    Siena Ureña PA-C [phone]       [address] [place] [address]        Equal Access to Services     CRISTIAN ZHONG : Prince Lopez, walienda luqadaha, qaybta kaalmakomal baker, ashlie preston. [facility] [phone].    ATENCIÓN: Si bertha astorga mcconnell  disposición servicios gratuitos de asistencia lingüística. Noel lopez [phone].    We comply with applicable federal civil rights laws and [place] laws. We do not discriminate on the basis of race, color, national origin, age, disability, sex, sexual orientation, or gender identity.            Thank you!     Thank you for choosing [facility] [place]  for your care. Our goal is always to provide you with excellent care. Hearing back from our patients is one way we can continue to improve our services. Please take a few minutes to complete the written survey that you may receive in the mail after your visit with us. Thank you!             Your Updated Medication List - Protect others around you: Learn how to safely use, store and throw away your medicines at www.disposemymeds.org.          This list is accurate as of 9/18/18 10:52 AM.  Always use your most recent med list.                   Brand Name Dispense Instructions for use Diagnosis    norgestim-eth estrad triphasic 0.18/0.215/0.25 MG-35 MCG per tablet    TRINESSA (28)    84 tablet    Take 1 tablet by mouth daily    Surveillance of previously prescribed contraceptive pill

## 2018-09-18 NOTE — PROGRESS NOTES
SUBJECTIVE:   CC: Jenny Caldwell is an 33 year old woman who presents for preventive health visit.     Physical   Annual:     Getting at least 3 servings of Calcium per day:  Yes    Bi-annual eye exam:  Yes    Dental care twice a year:  Yes    Sleep apnea or symptoms of sleep apnea:  None    Taking medications regularly:  Yes    Additional concerns today:  No    Occasional sharp pain on left side below breast and rib cage.  Has had something similar before. Comes and goes. No obvious trigger. Does not feel that food triggers this.    Not worse with bending, twisting, pulling, etc.  Sharp shooting pain, quick, goes away.   Hasn't noticed any breast changes.    Got a mammogram last year.  Mom diagnosed with breast cancer at age 47, passed away at age 50.      -------------------------------------    Today's PHQ-2 Score:   PHQ-2 ( 1999 [employer]) 9/18/2018   Q1: Little interest or pleasure in doing things 0   Q2: Feeling down, depressed or hopeless 1   PHQ-2 Score 1   Q1: Little interest or pleasure in doing things Not at all   Q2: Feeling down, depressed or hopeless Several days   PHQ-2 Score 1     Abuse: Current or Past(Physical, Sexual or Emotional)- No  Do you feel safe in your environment - Yes    Social History   Substance Use Topics     Smoking status: Never Smoker     Smokeless tobacco: Never Used     Alcohol use Yes      Comment: Once in a great while     Alcohol Use 9/18/2018   If you drink alcohol do you typically have greater than 3 drinks per day OR greater than 7 drinks per week? No   No flowsheet data found.    Reviewed orders with patient.  Reviewed health maintenance and updated orders accordingly - Yes    Alternate mammogram schedule due to FH of breast cancer in mother-recommended starting yearly at 35.    Pertinent mammograms are reviewed under the imaging tab.  History of abnormal Pap smear: NO - age 30- 65 PAP every 3 years recommended  PAP / HPV Latest Ref Rng & Units 9/27/2017 4/11/2014  11/1/2011   PAP - ASC-US(A) NIL NIL   HPV 16 DNA NEG:Negative Negative - -   HPV 18 DNA NEG:Negative Negative - -   OTHER HR HPV NEG:Negative Negative - -     Reviewed and updated as needed this visit by clinical staff  Tobacco  Allergies  Meds  Problems  Med Hx  Surg Hx  Fam Hx  Soc Hx          Reviewed and updated as needed this visit by Provider  Problems            Review of Systems   Constitutional: Negative for chills and fever.   HENT: Negative for congestion, ear pain, hearing loss and sore throat.    Eyes: Negative for pain and visual disturbance.   Respiratory: Negative for cough and shortness of breath.    Cardiovascular: Negative for chest pain, palpitations and peripheral edema.   Gastrointestinal: Negative for abdominal pain, constipation, diarrhea, heartburn, hematochezia and nausea.   Breasts:  Negative for tenderness, breast mass and discharge.   Genitourinary: Negative for dysuria, frequency, genital sores, hematuria, pelvic pain, urgency, vaginal bleeding and vaginal discharge.   Musculoskeletal: Negative for arthralgias and joint swelling.   Skin: Negative for rash.   Neurological: Negative for dizziness, weakness, headaches and paresthesias.   Psychiatric/Behavioral: The patient is not nervous/anxious.      OBJECTIVE:   BP 98/78 (Cuff Size: Adult Regular)  Pulse 60  Temp 98.1  F (36.7  C) (Oral)  Ht 5' (1.524 m)  Wt 134 lb (60.8 kg)  LMP 09/09/2018 (Approximate)  BMI 26.17 kg/m2  Physical Exam  GENERAL: healthy, alert and no distress  EYES: Eyes grossly normal to inspection, PERRL and conjunctivae and sclerae normal  HENT: ear canals and TM's normal, nose and mouth without ulcers or lesions  NECK: no adenopathy, no asymmetry, masses, or scars and thyroid normal to palpation  RESP: lungs clear to auscultation - no rales, rhonchi or wheezes  BREAST: normal without masses, tenderness or nipple discharge and no palpable axillary masses or adenopathy  CV: regular rate and rhythm, normal  S1 S2, no S3 or S4, no murmur, click or rub, no peripheral edema and peripheral pulses strong  ABDOMEN: soft, nontender, no hepatosplenomegaly, no masses and bowel sounds normal. Unable to reproduce LUQ pain on today's exam.  MS: no gross musculoskeletal defects noted, no edema  SKIN: no suspicious lesions or rashes  NEURO: Normal strength and tone, mentation intact and speech normal  PSYCH: mentation appears normal, affect normal/bright    Diagnostic Test Results:  none     ASSESSMENT/PLAN:   1. Encounter for routine adult medical exam with abnormal findings  Follow up pending above results. Encouraged healthy diet and regular exercise, monthy SBE, and yearly exams.  - Lipid panel reflex to direct LDL Fasting    2. Surveillance of previously prescribed contraceptive pill  Continue on pill for now.  Schedule an appointment with OBGYN for consult on BTL.  - norgestim-eth estrad triphasic (TRINESSA, 28,) 0.18/0.215/0.25 MG-35 MCG per tablet; Take 1 tablet by mouth daily  Dispense: 84 tablet; Refill: 4    3. Family history of breast cancer  Yearly mammograms recommended yearly starting at age 35 per Oncology recommendeations.  - MA Screen Bilateral w/Jaspal; Future    4. Left upper quadrant pain  Not present today, comes and goes.  Pt will continue to monitor for possible triggers, alleviating factors, etc.  She will return for further evaluation if symptoms worsen, become more frequent, etc.    5. General counseling and advice on female contraception  Referral for consultation for tubal ligation.  - OB/GYN REFERRAL      COUNSELING:  Reviewed preventive health counseling, as reflected in patient instructions    BP Readings from Last 1 Encounters:   09/18/18 98/78     Estimated body mass index is 26.17 kg/(m^2) as calculated from the following:    Height as of this encounter: 5' (1.524 m).    Weight as of this encounter: 134 lb (60.8 kg).     reports that she has never smoked. She has never used smokeless  tobacco.      Counseling Resources:  ATP IV Guidelines  Pooled Cohorts Equation Calculator  Breast Cancer Risk Calculator  FRAX Risk Assessment  ICSI Preventive Guidelines  Dietary Guidelines for Americans, 2010  USDA's MyPlate  ASA Prophylaxis  Lung CA Screening    Siena Ureña PA-C  [facility]  Answers for HPI/ROS submitted by the patient on 9/18/2018   PHQ-2 Score: 1

## 2018-09-19 LAB
CHOLEST SERPL-MCNC: 227 MG/DL
HDLC SERPL-MCNC: 82 MG/DL
LDLC SERPL CALC-MCNC: 122 MG/DL
NONHDLC SERPL-MCNC: 145 MG/DL
TRIGL SERPL-MCNC: 113 MG/DL

## 2018-09-19 NOTE — TELEPHONE ENCOUNTER
Duplicate.  This was sent to same pharmacy by PCP yesterday with receipt confirmed by pharmacy.  MyChart sent.    Tania Farias RN

## 2018-09-19 NOTE — TELEPHONE ENCOUNTER
"Requested Prescriptions   Pending Prescriptions Disp Refills     norgestim-eth estrad triphasic (TRINESSA, 28,) 0.18/0.215/0.25 MG-35 MCG per tablet  Last Written Prescription Date:  9/18/2018  Last Fill Quantity: 84 tabs,  # refills: 4   Last office visit: 9/18/2018 with prescribing provider:  Adelita   Future Office Visit:     84 tablet 4     Sig: Take 1 tablet by mouth daily    Contraceptives Protocol Passed    9/19/2018  8:57 AM       Passed - Patient is not a current smoker if age is 35 or older       Passed - Recent (12 mo) or future (30 days) visit within the authorizing provider's specialty    Patient had office visit in the last 12 months or has a visit in the next 30 days with authorizing provider or within the authorizing provider's specialty.  See \"Patient Info\" tab in inbasket, or \"Choose Columns\" in Meds & Orders section of the refill encounter.           Passed - No active pregnancy on record       Passed - No positive pregnancy test in past 12 months          "

## 2018-09-26 ENCOUNTER — TRANSFERRED RECORDS (OUTPATIENT)
Dept: HEALTH INFORMATION MANAGEMENT | Facility: CLINIC | Age: 33
End: 2018-09-26

## 2018-10-01 ENCOUNTER — TRANSFERRED RECORDS (OUTPATIENT)
Dept: HEALTH INFORMATION MANAGEMENT | Facility: CLINIC | Age: 33
End: 2018-10-01

## 2018-10-01 ENCOUNTER — OFFICE VISIT (OUTPATIENT)
Dept: FAMILY MEDICINE | Facility: CLINIC | Age: 33
End: 2018-10-01
Payer: COMMERCIAL

## 2018-10-01 VITALS
HEART RATE: 59 BPM | SYSTOLIC BLOOD PRESSURE: 120 MMHG | TEMPERATURE: 98.5 F | BODY MASS INDEX: 26.5 KG/M2 | WEIGHT: 135 LBS | DIASTOLIC BLOOD PRESSURE: 80 MMHG | HEIGHT: 60 IN

## 2018-10-01 DIAGNOSIS — K59.00 CONSTIPATION, UNSPECIFIED CONSTIPATION TYPE: ICD-10-CM

## 2018-10-01 DIAGNOSIS — R10.84 ABDOMINAL PAIN, GENERALIZED: Primary | ICD-10-CM

## 2018-10-01 PROCEDURE — 99214 OFFICE O/P EST MOD 30 MIN: CPT | Performed by: PHYSICIAN ASSISTANT

## 2018-10-01 RX ORDER — LACTULOSE 10 G/15ML
20 SOLUTION ORAL
COMMUNITY
Start: 2018-10-01 | End: 2018-10-03

## 2018-10-01 RX ORDER — ONDANSETRON 4 MG/1
4 TABLET, ORALLY DISINTEGRATING ORAL
COMMUNITY
Start: 2018-10-01 | End: 2019-08-06

## 2018-10-01 NOTE — MR AVS SNAPSHOT
After Visit Summary   10/1/2018    Jenny Caldwell    MRN: [id number]           Patient Information     Date Of Birth          1985        Visit Information        Provider Department      10/1/2018 4:40 PM Siena Ureña PA-C [facility] Instructions    Take medications as directed by the ED.  Call if symptoms are not improving.  After bowels have been cleaned out, then start Metamucil daily in the am for prevention of constipation.    Siena Ureña PA-C                    Constipation  What is constipation?   Constipation means that bowel movements are infrequent and hard to pass and cause you to strain during bowel movements. It is not considered to be constipation if the bowel movement is not hard and difficult to pass.  What is the normal frequency of bowel movements for one person can be different for another person. For some people, 3 times a day is normal. For others once every 3 days may be normal. What's important is whether there is a change in what has been normal for you.   How does it occur?   You may have constipation because:  You ignore the urge and wait too long to have bowel movements.   You overuse some types of laxatives.   You do not drink enough fluids.   You do not eat enough fiber.   You don't have enough physical activity.   You are taking iron pills or a medicine that has a side effect of constipation.  Other possible causes are:  pregnancy   depression or stress   some medical conditions and diseases.  What are the symptoms?   Symptoms may include having:  small bowel movements   hard, dry bowel movements   uncomfortable or painful bowel movements that are hard to pass   a longer time than usual between bowel movements   bloating and feeling like you have a full bowel.  Normal bowel movements vary from person to person. For some people, 3 times a day is normal. For others 3 times a week may be normal. What's important are changes  in what has been normal for you.  How is it treated?   To ease your constipation:  Drink more fluids.   Add more fiber to your diet, such as bran muffins, amarjit crackers, oatmeal, brown rice, whole wheat bread, fresh fruits and vegetables, and popcorn.   Get more exercise.   Make sure that you go to the bathroom whenever you feel that you need to go. Don t wait.  Laxatives may be used for a short time, generally less than 1 week. Many people find fiber supplements, such as Metamucil, Citrucel, or other psyllium products, to be helpful, but sometimes they can make constipation worse.  Ask your healthcare provider if any medicines you are taking may be causing constipation.  Tell your healthcare provider if:  You start having constipation after years of normal bowel movements.   You have bouts of constipation alternating with bouts of diarrhea.   You have pain during bowel movements or for some time afterward.   Your bowel movements are dark or tar-colored or have blood in them.   You are losing weight without trying.  How can I take care of myself?   To help take care of yourself:  Eat fresh vegetables and fruit every day.   Exercise regularly. For example, if you are able, walk for at least 30 minutes every day. Check with your healthcare provider before adding any new exercise.   Drink prune juice or eat stewed fruits at breakfast.   Drink enough liquids each day to keep your urine light yellow in color.   Increase the whole-grain fiber in your diet by eating cereals with 5 or more grams of fiber per serving (for example, shredded wheat or bran flakes).   Ask your healthcare provider about taking fiber products or laxatives or giving yourself an enema. You can take a fiber product like Metamucil or Citrucel once or twice a day for several days if you are constipated. Avoid overusing other laxatives, such as cathartics, which are products that will cause a liquid bowel movement. Cathartics, including Milk of  magnesia or Epsom salt, irritate the lining of the intestines.   Call your provider if:   Constipation lasts longer than 1 week.   You have constipation alternating with diarrhea.   You have blood in your stool.   You have severe abdominal pain.   You have abdominal swelling or vomiting.   You have a fever higher than 101.5  F (38.6  C).   You have any symptoms that worry you.     Published by [employer].  This content is reviewed periodically and is subject to change as new health information becomes available. The information is intended to inform and educate and is not a replacement for medical evaluation, advice, diagnosis or treatment by a healthcare professional.  Developed by Deja Rain, RN, [place], and [employer].    2011 [facility] and/or its affiliates. All rights reserved.                                Follow-ups after your visit        Who to contact     If you have questions or need follow up information about today's clinic visit or your schedule please contact [facility] directly at [phone].  Normal or non-critical lab and imaging results will be communicated to you by Noval[employer], letter or phone within 4 business days after the clinic has received the results. If you do not hear from us within 7 days, please contact the clinic through [employer] or phone. If you have a critical or abnormal lab result, we will notify you by phone as soon as possible.  Submit refill requests through [employer] or call your pharmacy and they will forward the refill request to us. Please allow 3 business days for your refill to be completed.          Additional Information About Your Visit        [employer] Information     [employer] gives you secure access to your electronic health record. If you see a primary care provider, you can also send messages to your care team and make appointments. If you have questions, please call your primary care clinic.  If you do not have a primary care provider, please  call [phone] and they will assist you.        Care EveryWhere ID     This is your Care EveryWhere ID. This could be used by other organizations to access your [place] medical records  [id number]        Your Vitals Were     Pulse Temperature Height Last Period BMI (Body Mass Index)       59 98.5  F (36.9  C) (Oral) 5' (1.524 m) 09/09/2018 (Approximate) 26.37 kg/m2        Blood Pressure from Last 3 Encounters:   10/01/18 120/80   09/18/18 98/78   09/27/17 104/68    Weight from Last 3 Encounters:   10/01/18 135 lb (61.2 kg)   09/18/18 134 lb (60.8 kg)   09/27/17 119 lb 8 oz (54.2 kg)              Today, you had the following     No orders found for display       Primary Care Provider Office Phone # Fax #    Siena Ureña PA-C [phone]       [address] [facility] [address]        Equal Access to Services     SUNNI ZHONG : Hadii berny ku hadasho Soomaali, waaxda luqadaha, qaybta kaalmada adeegyada, ashlie waite . So [facility] [phone].    ATENCIÓN: Si habla español, tiene a mcconnell disposición servicios gratuitos de asistencia lingüística. Llame al [phone].    We comply with applicable federal civil rights laws and [place] laws. We do not discriminate on the basis of race, color, national origin, age, disability, sex, sexual orientation, or gender identity.            Thank you!     Thank you for choosing [facility]  for your care. Our goal is always to provide you with excellent care. Hearing back from our patients is one way we can continue to improve our services. Please take a few minutes to complete the written survey that you may receive in the mail after your visit with us. Thank you!             Your Updated Medication List - Protect others around you: Learn how to safely use, store and throw away your medicines at www.disposemymeds.org.          This list is accurate as of 10/1/18  5:39 PM.  Always use your most recent med  list.                   Brand Name Dispense Instructions for use Diagnosis    lactulose 10 GM/15ML solution    CHRONULAC     Take 20 g by mouth        norgestim-eth estrad triphasic 0.18/0.215/0.25 MG-35 MCG per tablet    TRINESSA (28)    84 tablet    Take 1 tablet by mouth daily    Surveillance of previously prescribed contraceptive pill       omeprazole 20 MG CR capsule    priLOSEC     Take 20 mg by mouth        ondansetron 4 MG ODT tab    ZOFRAN-ODT     Place 4 mg under the tongue

## 2018-10-01 NOTE — PATIENT INSTRUCTIONS
Take medications as directed by the ED.  Call if symptoms are not improving.  After bowels have been cleaned out, then start Metamucil daily in the am for prevention of constipation.    Siena Ureña PA-C                    Constipation  What is constipation?   Constipation means that bowel movements are infrequent and hard to pass and cause you to strain during bowel movements. It is not considered to be constipation if the bowel movement is not hard and difficult to pass.  What is the normal frequency of bowel movements for one person can be different for another person. For some people, 3 times a day is normal. For others once every 3 days may be normal. What's important is whether there is a change in what has been normal for you.   How does it occur?   You may have constipation because:  You ignore the urge and wait too long to have bowel movements.   You overuse some types of laxatives.   You do not drink enough fluids.   You do not eat enough fiber.   You don't have enough physical activity.   You are taking iron pills or a medicine that has a side effect of constipation.  Other possible causes are:  pregnancy   depression or stress   some medical conditions and diseases.  What are the symptoms?   Symptoms may include having:  small bowel movements   hard, dry bowel movements   uncomfortable or painful bowel movements that are hard to pass   a longer time than usual between bowel movements   bloating and feeling like you have a full bowel.  Normal bowel movements vary from person to person. For some people, 3 times a day is normal. For others 3 times a week may be normal. What's important are changes in what has been normal for you.  How is it treated?   To ease your constipation:  Drink more fluids.   Add more fiber to your diet, such as bran muffins, amarjit crackers, oatmeal, brown rice, whole wheat bread, fresh fruits and vegetables, and popcorn.   Get more exercise.   Make sure that you go to the  bathroom whenever you feel that you need to go. Don t wait.  Laxatives may be used for a short time, generally less than 1 week. Many people find fiber supplements, such as Metamucil, Citrucel, or other psyllium products, to be helpful, but sometimes they can make constipation worse.  Ask your healthcare provider if any medicines you are taking may be causing constipation.  Tell your healthcare provider if:  You start having constipation after years of normal bowel movements.   You have bouts of constipation alternating with bouts of diarrhea.   You have pain during bowel movements or for some time afterward.   Your bowel movements are dark or tar-colored or have blood in them.   You are losing weight without trying.  How can I take care of myself?   To help take care of yourself:  Eat fresh vegetables and fruit every day.   Exercise regularly. For example, if you are able, walk for at least 30 minutes every day. Check with your healthcare provider before adding any new exercise.   Drink prune juice or eat stewed fruits at breakfast.   Drink enough liquids each day to keep your urine light yellow in color.   Increase the whole-grain fiber in your diet by eating cereals with 5 or more grams of fiber per serving (for example, shredded wheat or bran flakes).   Ask your healthcare provider about taking fiber products or laxatives or giving yourself an enema. You can take a fiber product like Metamucil or Citrucel once or twice a day for several days if you are constipated. Avoid overusing other laxatives, such as cathartics, which are products that will cause a liquid bowel movement. Cathartics, including Milk of magnesia or Epsom salt, irritate the lining of the intestines.   Call your provider if:   Constipation lasts longer than 1 week.   You have constipation alternating with diarrhea.   You have blood in your stool.   You have severe abdominal pain.   You have abdominal swelling or vomiting.   You have a fever  higher than 101.5  F (38.6  C).   You have any symptoms that worry you.     Published by [employer].  This content is reviewed periodically and is subject to change as new health information becomes available. The information is intended to inform and educate and is not a replacement for medical evaluation, advice, diagnosis or treatment by a healthcare professional.  Developed by Deja Rain RN, [place], and [facility].    2011 [facility] and/or its affiliates. All rights reserved.

## 2018-10-01 NOTE — PROGRESS NOTES
SUBJECTIVE:   Jenny Caldwell is a 33 year old female who presents to clinic today for the following health issues:      ED/UC Followup:    Facility:  [facility]  Date of visit: 9/26/18 & 10/1/18  Reason for visit: Pelvic pain & Abdominal Pain, nausea and constipation   Current Status: patient states that symptoms feel like they have gotten a little bit better, but not sure if its from the morphine that she got this morning.  She feels more pain in her abdomen, gas pain.  She just doesn't feel like herself, worn out and tired.       Impression and Plan:  This is a 33 y.o. female who presents here with abdominal pain. Patient seen here several days ago for similar symptoms. She comes back because there has been no improvement. Patient's laboratory workup here is largely unremarkable. Patient is afebrile and hemodynamically stable. She has a benign exam without any guarding or rebounding, specifically no right lower quadrant tenderness. ultrasounds were negative. X-ray was obtained and shows what I feel is a large amount of stool likely contributing to her symptoms. Other consideration would be for gastritis or peptic ulcer disease. At this point I do not feel that she requires further Emergency Department workup, imaging, or testing. I would recommend that she follow-up with primary care, she has an appointment this evening, may need outpatient GI consultation. She is instructed to return here if she has any new or worsening symptoms.     Today, she notes pain higher up in her bilateral abdomen.  Radiates to her back at times.  Pain feels like cramping that waxes and wanes. She can not recall when her last BM was. She has been trying to increase her fluids.  Has only had 1 dose of Lactulose thus far. Denies any new fevers, chills.  Is able to pass gas.  Appetite is down.    -------------------------------------    Problem list and histories reviewed & adjusted, as indicated.  Additional history: as  documented    Reviewed and updated as needed this visit by clinical staff  Tobacco  Allergies  Meds  Med Hx  Surg Hx  Fam Hx  Soc Hx      Reviewed and updated as needed this visit by Provider         ROS:  Constitutional, HEENT, cardiovascular, pulmonary, gi and gu systems are negative, except as otherwise noted.    OBJECTIVE:     /80 (Cuff Size: Adult Regular)  Pulse 59  Temp 98.5  F (36.9  C) (Oral)  Ht 5' (1.524 m)  Wt 135 lb (61.2 kg)  LMP 09/09/2018 (Approximate)  BMI 26.37 kg/m2  Body mass index is 26.37 kg/(m^2).  GENERAL: healthy, alert and no distress  NECK: no adenopathy, no asymmetry, masses, or scars and thyroid normal to palpation  RESP: lungs clear to auscultation - no rales, rhonchi or wheezes  CV: regular rate and rhythm, normal S1 S2, no S3 or S4, no murmur, click or rub, no peripheral edema and peripheral pulses strong  ABDOMEN: soft, mild bilateral upper quadrant tenderness, no hepatosplenomegaly, no masses and bowel sounds normal  BACK: no CVA tenderness, no paralumbar tenderness  PSYCH: mentation appears normal, affect normal/bright    Diagnostic Test Results:  none    ASSESSMENT/PLAN:   1. Abdominal pain, generalize  2. Constipation, unspecified constipation type  She will continue to take Lactulose as directed by the ED.  She has only taken 1 dose thus far.  Increase fluids.  Call tomorrow if no bowel movement or if pain worsens.  Discussed warning s/s for which to seek emergent care.    Siena Ureña PA-C  [facility]

## 2018-11-12 ENCOUNTER — ALLIED HEALTH/NURSE VISIT (OUTPATIENT)
Dept: NURSING | Facility: CLINIC | Age: 33
End: 2018-11-12
Payer: COMMERCIAL

## 2018-11-12 DIAGNOSIS — Z23 NEED FOR PROPHYLACTIC VACCINATION AND INOCULATION AGAINST INFLUENZA: Primary | ICD-10-CM

## 2018-11-12 PROCEDURE — 90471 IMMUNIZATION ADMIN: CPT

## 2018-11-12 PROCEDURE — 99207 ZZC NO CHARGE NURSE ONLY: CPT

## 2018-11-12 PROCEDURE — 90686 IIV4 VACC NO PRSV 0.5 ML IM: CPT

## 2018-11-12 NOTE — MR AVS SNAPSHOT
After Visit Summary   11/12/2018    Jenny Caldwell    MRN: [id number]           Patient Information     Date Of Birth          1985        Visit Information        Provider Department      11/12/2018 4:20 PM CARE COORDINATOR [facility]        Today's Diagnoses     Need for prophylactic vaccination and inoculation against influenza    -  1       Follow-ups after your visit        Who to contact     If you have questions or need follow up information about today's clinic visit or your schedule please contact [facility] directly at [phone].  Normal or non-critical lab and imaging results will be communicated to you by [employer], letter or phone within 4 business days after the clinic has received the results. If you do not hear from us within 7 days, please contact the clinic through [employer] or phone. If you have a critical or abnormal lab result, we will notify you by phone as soon as possible.  Submit refill requests through [employer] or call your pharmacy and they will forward the refill request to us. Please allow 3 business days for your refill to be completed.          Additional Information About Your Visit        MyChart Information     [employer] gives you secure access to your electronic health record. If you see a primary care provider, you can also send messages to your care team and make appointments. If you have questions, please call your primary care clinic.  If you do not have a primary care provider, please call [phone] and they will assist you.        Care EveryWhere ID     This is your Care EveryWhere ID. This could be used by other organizations to access your [facility] medical records  [id number]         Blood Pressure from Last 3 Encounters:   10/01/18 120/80   09/18/18 98/78   09/27/17 104/68    Weight from Last 3 Encounters:   10/01/18 135 lb (61.2 kg)   09/18/18 134 lb (60.8 kg)   09/27/17 119 lb 8 oz (54.2 kg)              We  Performed the Following     FLU VACCINE, SPLIT VIRUS, IM (QUADRIVALENT) [79916]- >3 YRS     Vaccine Administration, Initial [98799]        Primary Care Provider Office Phone # Fax #    Siena Ureña PA-C [phone] [address] [facility] [address]        Equal Access to Services     CRISTIAN ZHONG : Hadii aad ku hadasho Soomaali, waaxda luqadaha, qaybta kaalmada adeegyada, waxay montanain hayaan adebryon maganacabreraeverardo waite . So [facility] [phone].    ATENCIÓN: Si habla español, tiene a mcconnell disposición servicios gratuitos de asistencia lingüística. Llame al [phone].    We comply with applicable federal civil rights laws and [place] laws. We do not discriminate on the basis of race, color, national origin, age, disability, sex, sexual orientation, or gender identity.            Thank you!     Thank you for choosing [facility]  for your care. Our goal is always to provide you with excellent care. Hearing back from our patients is one way we can continue to improve our services. Please take a few minutes to complete the written survey that you may receive in the mail after your visit with us. Thank you!             Your Updated Medication List - Protect others around you: Learn how to safely use, store and throw away your medicines at www.disposemymeds.org.          This list is accurate as of 11/12/18  4:33 PM.  Always use your most recent med list.                   Brand Name Dispense Instructions for use Diagnosis    norgestim-eth estrad triphasic 0.18/0.215/0.25 MG-35 MCG per tablet    TRINESSA (28)    84 tablet    Take 1 tablet by mouth daily    Surveillance of previously prescribed contraceptive pill       omeprazole 20 MG CR capsule    priLOSEC     Take 20 mg by mouth        ondansetron 4 MG ODT tab    ZOFRAN-ODT     Place 4 mg under the tongue

## 2018-11-12 NOTE — PROGRESS NOTES

## 2019-07-16 ENCOUNTER — TELEPHONE (OUTPATIENT)
Dept: OBGYN | Facility: CLINIC | Age: 34
End: 2019-07-16

## 2019-07-16 DIAGNOSIS — O20.9 BLEEDING IN EARLY PREGNANCY: ICD-10-CM

## 2019-07-16 DIAGNOSIS — O20.9 BLEEDING IN EARLY PREGNANCY: Primary | ICD-10-CM

## 2019-07-16 LAB — B-HCG SERPL-ACNC: ABNORMAL IU/L (ref 0–5)

## 2019-07-16 PROCEDURE — 84702 CHORIONIC GONADOTROPIN TEST: CPT | Performed by: OBSTETRICS & GYNECOLOGY

## 2019-07-16 PROCEDURE — 36415 COLL VENOUS BLD VENIPUNCTURE: CPT | Performed by: OBSTETRICS & GYNECOLOGY

## 2019-07-16 NOTE — TELEPHONE ENCOUNTER
Patient calling with concerns of bleeding during pregnancy and was transferred to [facility] from [facility].  LMP was probably at the end of May, beginning of June. Pt around 6 weeks along. Took UPT and it was positive on July 12. First had heavy brown discharge on Sunday. Then started having bright red bleeding on Monday. Was bleeding quite a bit and thought she had her period. Today has the brown discharge again. When wiping has mixed brown with stringy discharge. Having some pain in lower back. Having some cramping which goes in waves. No recent intercourse, no straining with BM, no heavy lifting. Patient wondering if she needs to be seen. Discussed with patient I would send a message to on-call provider. Please advise - do you want her to monitor, come in for labs?   Radha Suazo RN-BSN

## 2019-07-16 NOTE — TELEPHONE ENCOUNTER
TC to patient. Scheduled lab only appt for NE today and Thurs. Hcg quant lab ordered.   Radha Suazo RN-BSN

## 2019-07-17 DIAGNOSIS — O20.9 BLEEDING IN EARLY PREGNANCY: Primary | ICD-10-CM

## 2019-07-18 DIAGNOSIS — O20.9 BLEEDING IN EARLY PREGNANCY: ICD-10-CM

## 2019-07-18 LAB — B-HCG SERPL-ACNC: ABNORMAL IU/L (ref 0–5)

## 2019-07-18 PROCEDURE — 84702 CHORIONIC GONADOTROPIN TEST: CPT | Performed by: OBSTETRICS & GYNECOLOGY

## 2019-07-18 PROCEDURE — 36415 COLL VENOUS BLD VENIPUNCTURE: CPT | Performed by: OBSTETRICS & GYNECOLOGY

## 2019-08-01 ENCOUNTER — OFFICE VISIT (OUTPATIENT)
Dept: FAMILY MEDICINE | Facility: CLINIC | Age: 34
End: 2019-08-01
Payer: COMMERCIAL

## 2019-08-01 VITALS
SYSTOLIC BLOOD PRESSURE: 104 MMHG | TEMPERATURE: 98.3 F | BODY MASS INDEX: 26.7 KG/M2 | HEIGHT: 60 IN | WEIGHT: 136 LBS | DIASTOLIC BLOOD PRESSURE: 68 MMHG

## 2019-08-01 DIAGNOSIS — M77.11 LATERAL EPICONDYLITIS OF RIGHT ELBOW: ICD-10-CM

## 2019-08-01 DIAGNOSIS — O20.0 THREATENED MISCARRIAGE IN EARLY PREGNANCY: Primary | ICD-10-CM

## 2019-08-01 LAB — HGB BLD-MCNC: 13.3 G/DL (ref 11.7–15.7)

## 2019-08-01 PROCEDURE — 84702 CHORIONIC GONADOTROPIN TEST: CPT | Performed by: FAMILY MEDICINE

## 2019-08-01 PROCEDURE — 99214 OFFICE O/P EST MOD 30 MIN: CPT | Performed by: FAMILY MEDICINE

## 2019-08-01 PROCEDURE — 36415 COLL VENOUS BLD VENIPUNCTURE: CPT | Performed by: FAMILY MEDICINE

## 2019-08-01 PROCEDURE — 85018 HEMOGLOBIN: CPT | Performed by: FAMILY MEDICINE

## 2019-08-01 ASSESSMENT — MIFFLIN-ST. JEOR: SCORE: 1224.42
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
## 2019-08-01 NOTE — PATIENT INSTRUCTIONS
To schedule your ultrasound call [phone].  Mention that you are having a possible miscarriage and that this is a follow up ultrasound.     [facility]     Discharged by : Jyothi Mcrae     If you have any questions regarding your visit please contact your care team:     Team Jennifer              Clinic Hours Telephone Number     Dr. Marvin Veloz, CNP   7am-7pm  Monday - Thursday   7am-5pm  Fridays  [phone]   (Appointment scheduling available 24/7)     RN Line  [phone] option 2     Urgent Care - [place] - 11am-9pm Monday-Friday Saturday-Sunday- 9am-5pm     [place] -   5pm-9pm Monday-Friday Saturday-Sunday- 9am-5pm    [phone] - Johanna Hernandes    [phone] - [place]     For a Price Quote for your services, please call our Consumer Price Line at [phone].     What options do I have for visits at the clinic other than the traditional office visit?     To expand how we care for you, many of our providers are utilizing electronic visits (e-visits) and telephone visits, when medically appropriate, for interactions with their patients rather than a visit in the clinic. We also offer nurse visits for many medical concerns. Just like any other service, we will bill your insurance company for this type of visit based on time spent on the phone with your provider. Not all insurance companies cover these visits. Please check with your medical insurance if this type of visit is covered. You will be responsible for any charges that are not paid by your insurance.     E-visits via SmartCare system: generally incur a $45.00 fee.     Telephone visits:  Time spent on the phone: *charged based on time that is spent on the phone in increments of 10 minutes. Estimated cost:   5-10 mins $30.00   11-20 mins. $59.00   21-30 mins. $85.00       Use SmartCare system (secure email communication and access to your chart) to send your primary care  provider a message or make an appointment. Ask someone on your Team how to sign up for [employer].     As always, Thank you for trusting us with your health care needs!      [facility]:    Scheduling Appointments  [place] [place]  Call: [phone]    Misa Atkinson [facility]  Call: [phone]    [facility]  Call: [phone]    For Gastroenterology referrals   [facility]   Clinics and Surgery [place], 4th Floor   [address] [place] [address]   Appointments: [phone]    WHERE TO GO FOR CARE?    Clinic    Make an appointment if you:       Are sick (cold, cough, flu, sore throat, earache or in pain).       Have a small injury (sprain, small cut, burn or broken bone).       Need a physical exam, Pap smear, vaccine or prescription refill.       Have questions about your health or medicines.    To reach us:      Call [phone] ([phone]). Open 24 hours every day. (For counseling services, call [phone].)    Log into [employer] at [email or web address]. (Visit [email or web address] to create an account.) Hospital emergency room    An emergency is a serious or life- threatening problem that must be treated right away.    Call 039 or get to the hospital if you have:      Very bad or sudden:            - Chest pain or pressure         - Bleeding         - Head or belly pain         - Dizziness or trouble seeing, walking or                          Speaking      Problems breathing      Blood in your vomit or you are coughing up blood      A major injury (knocked out, loss of a finger or limb, rape, broken bone protruding from skin)    A mental health crisis. (Or call the Mental Health Crisis line at 1-915.474.6101 or Suicide Prevention Hotline at 1-525.493.6109.)    Open 24 hours every day. You don't need an appointment.     Urgent care    Visit urgent care for sickness or small injuries when the clinic is  closed. You don't need an appointment. To check hours or find an urgent care near you, visit [email or web address]. Online care    Get online care from [facility] for more than 70 common problems, like colds, allergies and infections. Open 24 hours every day at:   [email or web address]   Need help deciding?    For advice about where to be seen, you may call your clinic and ask to speak with a nurse. We're here for you 24 hours every day.         If you are deaf or hard of hearing, please let us know. We provide many free services including sign language interpreters, oral interpreters, TTYs, telephone amplifiers, note takers and written materials.

## 2019-08-02 ENCOUNTER — HOSPITAL ENCOUNTER (OUTPATIENT)
Dept: ULTRASOUND IMAGING | Facility: CLINIC | Age: 34
Discharge: HOME | End: 2019-08-02
Attending: FAMILY MEDICINE | Admitting: FAMILY MEDICINE
Payer: COMMERCIAL

## 2019-08-02 DIAGNOSIS — O20.0 THREATENED MISCARRIAGE IN EARLY PREGNANCY: Primary | ICD-10-CM

## 2019-08-02 DIAGNOSIS — O20.0 THREATENED MISCARRIAGE IN EARLY PREGNANCY: ICD-10-CM

## 2019-08-02 LAB — B-HCG SERPL-ACNC: 4451 IU/L (ref 0–5)

## 2019-08-02 PROCEDURE — 76801 OB US < 14 WKS SINGLE FETUS: CPT

## 2019-08-05 ENCOUNTER — TELEPHONE (OUTPATIENT)
Dept: FAMILY MEDICINE | Facility: CLINIC | Age: 34
End: 2019-08-05

## 2019-08-05 NOTE — TELEPHONE ENCOUNTER
Routing to provider pool to please advise. Patient calls and is very anxious and requesting the results from ultrasound. Ultrasound was completed on 8/2/19.    Bianka Carpenter RN

## 2019-08-05 NOTE — TELEPHONE ENCOUNTER
Patient/family was instructed to return call to [facility] RN directly on the RN Call back line at [phone].     Tania Farias RN

## 2019-08-05 NOTE — TELEPHONE ENCOUNTER
Reviewed - sorry for the delay. The US confirms what the last ultrasound showed - there is no fetal activity - this appears to have just a gestational sac without any contents - meaning that her body with the heavy bleeding has expelled most of the failed pregnancy. So - no new news.    Her HCG level was going down as of last check. How's her bleeding level? Does she have a recheck scheduled for blood HCG per Dr. Granados's recommendations? She can see OB / GYN to review if the remaining gestational sac requires surgical removal or if it will expel on its own.    Let me know what questions she has right now.   Thanks.  Jer Mccann, MPAS, PA-C

## 2019-08-05 NOTE — TELEPHONE ENCOUNTER
Reason for Call:  Request for results:    Name of test or procedure:  OB Ultrasound    Date of test of procedure: 8/2/19    Location of the test or procedure: [facility] to leave the result message on voice mail or with a family member? YES    Phone number Patient can be reached at:  Home number on file [phone] (home)    Additional comments: Patient stated she still has not received her results from her ultrasound. She would like a call back as soon as possible to discuss results.    Call taken on 8/5/2019 at 2:45 PM by Harriet Herron

## 2019-08-06 ENCOUNTER — OFFICE VISIT (OUTPATIENT)
Dept: OBGYN | Facility: CLINIC | Age: 34
End: 2019-08-06
Payer: COMMERCIAL

## 2019-08-06 ENCOUNTER — TELEPHONE (OUTPATIENT)
Dept: OBGYN | Facility: CLINIC | Age: 34
End: 2019-08-06

## 2019-08-06 VITALS
OXYGEN SATURATION: 100 % | HEIGHT: 60 IN | BODY MASS INDEX: 26.97 KG/M2 | HEART RATE: 69 BPM | SYSTOLIC BLOOD PRESSURE: 112 MMHG | DIASTOLIC BLOOD PRESSURE: 77 MMHG | WEIGHT: 137.4 LBS

## 2019-08-06 DIAGNOSIS — Z01.818 PRE-OP EXAM: Primary | ICD-10-CM

## 2019-08-06 DIAGNOSIS — Z30.09 STERILIZATION CONSULT: ICD-10-CM

## 2019-08-06 DIAGNOSIS — O02.1 MISSED AB: Primary | ICD-10-CM

## 2019-08-06 DIAGNOSIS — O02.1 MISSED AB: ICD-10-CM

## 2019-08-06 DIAGNOSIS — O20.0 THREATENED MISCARRIAGE IN EARLY PREGNANCY: ICD-10-CM

## 2019-08-06 PROCEDURE — 84702 CHORIONIC GONADOTROPIN TEST: CPT | Performed by: FAMILY MEDICINE

## 2019-08-06 PROCEDURE — 36415 COLL VENOUS BLD VENIPUNCTURE: CPT | Performed by: FAMILY MEDICINE

## 2019-08-06 PROCEDURE — 99203 OFFICE O/P NEW LOW 30 MIN: CPT | Performed by: OBSTETRICS & GYNECOLOGY

## 2019-08-06 ASSESSMENT — MIFFLIN-ST. JEOR: SCORE: 1230.77

## 2019-08-06 NOTE — TELEPHONE ENCOUNTER
Patient returns call to RANJAN MEAD.  Called patient back and relayed information from provider below. Patient has lab appt for HCG recheck this morning. She reports she is bleeding only lightly now, not passing any more clots, etc, and body is feeling pretty normal at this point. She will schedule with OB/GYN re: any further action needed.    Tania Farias RN

## 2019-08-06 NOTE — Clinical Note
Ryley Tay,I saw your patient, Jenny Caldwell. We will be doing a D & C tomorrow for her. Thanks for the consult, Aria
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
## 2019-08-06 NOTE — TELEPHONE ENCOUNTER
Type of surgery: gyn  Location of surgery: [facility] [place]  Date and time of surgery: 8/8/19 1200p  Surgeon: Jono  Pre-Op Appt Date: done  Post-Op Appt Date: 8/20/10   Packet sent out: No - [employer] message sent  Pre-cert/Authorization completed:  No  Date: 8/6/2019    Evelyne CABAN, Surgery Coordinator

## 2019-08-07 PROBLEM — Z30.09 STERILIZATION CONSULT: Status: ACTIVE | Noted: 2019-08-07

## 2019-08-07 PROBLEM — O02.1 MISSED AB: Status: ACTIVE | Noted: 2019-08-07

## 2019-08-07 LAB — B-HCG SERPL-ACNC: 3078 IU/L (ref 0–5)

## 2019-08-08 ENCOUNTER — ANESTHESIA EVENT (OUTPATIENT)
Dept: SURGERY | Facility: CLINIC | Age: 34
End: 2019-08-08
Payer: COMMERCIAL

## 2019-08-08 ENCOUNTER — SURGERY (OUTPATIENT)
Age: 34
End: 2019-08-08
Payer: COMMERCIAL

## 2019-08-08 ENCOUNTER — ANESTHESIA (OUTPATIENT)
Dept: SURGERY | Facility: CLINIC | Age: 34
End: 2019-08-08
Payer: COMMERCIAL

## 2019-08-08 ENCOUNTER — HOSPITAL ENCOUNTER (OUTPATIENT)
Facility: CLINIC | Age: 34
Discharge: HOME | End: 2019-08-08
Attending: OBSTETRICS & GYNECOLOGY | Admitting: OBSTETRICS & GYNECOLOGY
Payer: COMMERCIAL

## 2019-08-08 VITALS
BODY MASS INDEX: 26.79 KG/M2 | HEIGHT: 60 IN | DIASTOLIC BLOOD PRESSURE: 71 MMHG | OXYGEN SATURATION: 100 % | SYSTOLIC BLOOD PRESSURE: 101 MMHG | TEMPERATURE: 97.5 F | HEART RATE: 51 BPM | RESPIRATION RATE: 16 BRPM | WEIGHT: 136.47 LBS

## 2019-08-08 DIAGNOSIS — O02.1 MISSED AB: Primary | ICD-10-CM

## 2019-08-08 LAB
ABO + RH BLD: NORMAL
ABO + RH BLD: NORMAL
BLD GP AB SCN SERPL QL: NORMAL
BLOOD BANK CMNT PATIENT-IMP: NORMAL
ERYTHROCYTE [DISTWIDTH] IN BLOOD BY AUTOMATED COUNT: 12.3 % (ref 10–15)
GLUCOSE SERPL-MCNC: 88 MG/DL (ref 70–99)
HCT VFR BLD AUTO: 39.8 % (ref 35–47)
HGB BLD-MCNC: 13.3 G/DL (ref 11.7–15.7)
MCH RBC QN AUTO: 31.8 PG (ref 26.5–33)
MCHC RBC AUTO-ENTMCNC: 33.4 G/DL (ref 31.5–36.5)
MCV RBC AUTO: 95 FL (ref 78–100)
PLATELET # BLD AUTO: 382 10E9/L (ref 150–450)
RBC # BLD AUTO: 4.18 10E12/L (ref 3.8–5.2)
SPECIMEN EXP DATE BLD: NORMAL
WBC # BLD AUTO: 6.2 10E9/L (ref 4–11)

## 2019-08-08 PROCEDURE — 25000125 ZZHC RX 250: Performed by: OBSTETRICS & GYNECOLOGY

## 2019-08-08 PROCEDURE — 00000159 ZZHCL STATISTIC H-SEND OUTS PREP: Performed by: OBSTETRICS & GYNECOLOGY

## 2019-08-08 PROCEDURE — 88305 TISSUE EXAM BY PATHOLOGIST: CPT | Performed by: OBSTETRICS & GYNECOLOGY

## 2019-08-08 PROCEDURE — 85027 COMPLETE CBC AUTOMATED: CPT | Performed by: ANESTHESIOLOGY

## 2019-08-08 PROCEDURE — 37000009 ZZH ANESTHESIA TECHNICAL FEE, EACH ADDTL 15 MIN: Performed by: OBSTETRICS & GYNECOLOGY

## 2019-08-08 PROCEDURE — 71000027 ZZH RECOVERY PHASE 2 EACH 15 MINS: Performed by: OBSTETRICS & GYNECOLOGY

## 2019-08-08 PROCEDURE — 37000008 ZZH ANESTHESIA TECHNICAL FEE, 1ST 30 MIN: Performed by: OBSTETRICS & GYNECOLOGY

## 2019-08-08 PROCEDURE — 82947 ASSAY GLUCOSE BLOOD QUANT: CPT | Performed by: ANESTHESIOLOGY

## 2019-08-08 PROCEDURE — 27210794 ZZH OR GENERAL SUPPLY STERILE: Performed by: OBSTETRICS & GYNECOLOGY

## 2019-08-08 PROCEDURE — 40000170 ZZH STATISTIC PRE-PROCEDURE ASSESSMENT II: Performed by: OBSTETRICS & GYNECOLOGY

## 2019-08-08 PROCEDURE — 25800030 ZZH RX IP 258 OP 636: Performed by: NURSE ANESTHETIST, CERTIFIED REGISTERED

## 2019-08-08 PROCEDURE — 86850 RBC ANTIBODY SCREEN: CPT | Performed by: OBSTETRICS & GYNECOLOGY

## 2019-08-08 PROCEDURE — 36415 COLL VENOUS BLD VENIPUNCTURE: CPT | Performed by: ANESTHESIOLOGY

## 2019-08-08 PROCEDURE — 86900 BLOOD TYPING SEROLOGIC ABO: CPT | Performed by: OBSTETRICS & GYNECOLOGY

## 2019-08-08 PROCEDURE — 25000125 ZZHC RX 250: Performed by: ANESTHESIOLOGY

## 2019-08-08 PROCEDURE — 59820 CARE OF MISCARRIAGE: CPT | Mod: GC | Performed by: OBSTETRICS & GYNECOLOGY

## 2019-08-08 PROCEDURE — 25000128 H RX IP 250 OP 636: Performed by: NURSE ANESTHETIST, CERTIFIED REGISTERED

## 2019-08-08 PROCEDURE — 88305 TISSUE EXAM BY PATHOLOGIST: CPT | Mod: 26 | Performed by: OBSTETRICS & GYNECOLOGY

## 2019-08-08 PROCEDURE — 86901 BLOOD TYPING SEROLOGIC RH(D): CPT | Performed by: OBSTETRICS & GYNECOLOGY

## 2019-08-08 PROCEDURE — 36000051 ZZH SURGERY LEVEL 2 1ST 30 MIN - UMMC: Performed by: OBSTETRICS & GYNECOLOGY

## 2019-08-08 PROCEDURE — 36000053 ZZH SURGERY LEVEL 2 EA 15 ADDTL MIN - UMMC: Performed by: OBSTETRICS & GYNECOLOGY

## 2019-08-08 RX ORDER — IBUPROFEN 600 MG/1
600 TABLET, FILM COATED ORAL EVERY 6 HOURS PRN
Qty: 30 TABLET | Refills: 0 | Status: SHIPPED | OUTPATIENT
Start: 2019-08-08 | End: 2019-08-20

## 2019-08-08 RX ORDER — ACETAMINOPHEN 325 MG/1
975 TABLET ORAL ONCE
Status: CANCELLED | OUTPATIENT
Start: 2019-08-08 | End: 2019-08-08

## 2019-08-08 RX ORDER — LIDOCAINE HYDROCHLORIDE 10 MG/ML
INJECTION, SOLUTION INFILTRATION; PERINEURAL PRN
Status: DISCONTINUED | OUTPATIENT
Start: 2019-08-08 | End: 2019-08-08 | Stop reason: HOSPADM

## 2019-08-08 RX ORDER — SODIUM CHLORIDE, SODIUM LACTATE, POTASSIUM CHLORIDE, CALCIUM CHLORIDE 600; 310; 30; 20 MG/100ML; MG/100ML; MG/100ML; MG/100ML
INJECTION, SOLUTION INTRAVENOUS CONTINUOUS
Status: CANCELLED | OUTPATIENT
Start: 2019-08-08

## 2019-08-08 RX ORDER — DEXAMETHASONE SODIUM PHOSPHATE 4 MG/ML
INJECTION, SOLUTION INTRA-ARTICULAR; INTRALESIONAL; INTRAMUSCULAR; INTRAVENOUS; SOFT TISSUE PRN
Status: DISCONTINUED | OUTPATIENT
Start: 2019-08-08 | End: 2019-08-08

## 2019-08-08 RX ORDER — SODIUM CHLORIDE, SODIUM LACTATE, POTASSIUM CHLORIDE, CALCIUM CHLORIDE 600; 310; 30; 20 MG/100ML; MG/100ML; MG/100ML; MG/100ML
INJECTION, SOLUTION INTRAVENOUS CONTINUOUS
Status: DISCONTINUED | OUTPATIENT
Start: 2019-08-08 | End: 2019-08-08 | Stop reason: HOSPADM

## 2019-08-08 RX ORDER — SCOLOPAMINE TRANSDERMAL SYSTEM 1 MG/1
1 PATCH, EXTENDED RELEASE TRANSDERMAL ONCE
Status: COMPLETED | OUTPATIENT
Start: 2019-08-08 | End: 2019-08-08

## 2019-08-08 RX ORDER — FENTANYL CITRATE 50 UG/ML
25-50 INJECTION, SOLUTION INTRAMUSCULAR; INTRAVENOUS
Status: CANCELLED | OUTPATIENT
Start: 2019-08-08

## 2019-08-08 RX ORDER — SODIUM CHLORIDE, SODIUM LACTATE, POTASSIUM CHLORIDE, CALCIUM CHLORIDE 600; 310; 30; 20 MG/100ML; MG/100ML; MG/100ML; MG/100ML
INJECTION, SOLUTION INTRAVENOUS CONTINUOUS PRN
Status: DISCONTINUED | OUTPATIENT
Start: 2019-08-08 | End: 2019-08-08

## 2019-08-08 RX ORDER — MEPERIDINE HYDROCHLORIDE 25 MG/ML
12.5 INJECTION INTRAMUSCULAR; INTRAVENOUS; SUBCUTANEOUS
Status: CANCELLED | OUTPATIENT
Start: 2019-08-08

## 2019-08-08 RX ORDER — LIDOCAINE 40 MG/G
CREAM TOPICAL
Status: DISCONTINUED | OUTPATIENT
Start: 2019-08-08 | End: 2019-08-08 | Stop reason: HOSPADM

## 2019-08-08 RX ORDER — ONDANSETRON 2 MG/ML
4 INJECTION INTRAMUSCULAR; INTRAVENOUS EVERY 30 MIN PRN
Status: CANCELLED | OUTPATIENT
Start: 2019-08-08

## 2019-08-08 RX ORDER — KETOROLAC TROMETHAMINE 30 MG/ML
INJECTION, SOLUTION INTRAMUSCULAR; INTRAVENOUS PRN
Status: DISCONTINUED | OUTPATIENT
Start: 2019-08-08 | End: 2019-08-08

## 2019-08-08 RX ORDER — IBUPROFEN 600 MG/1
600 TABLET, FILM COATED ORAL
Status: CANCELLED | OUTPATIENT
Start: 2019-08-08

## 2019-08-08 RX ORDER — NALOXONE HYDROCHLORIDE 0.4 MG/ML
.1-.4 INJECTION, SOLUTION INTRAMUSCULAR; INTRAVENOUS; SUBCUTANEOUS
Status: CANCELLED | OUTPATIENT
Start: 2019-08-08 | End: 2019-08-09

## 2019-08-08 RX ORDER — PROPOFOL 10 MG/ML
INJECTION, EMULSION INTRAVENOUS CONTINUOUS PRN
Status: DISCONTINUED | OUTPATIENT
Start: 2019-08-08 | End: 2019-08-08

## 2019-08-08 RX ORDER — ACETAMINOPHEN 325 MG/1
650 TABLET ORAL EVERY 4 HOURS PRN
Qty: 50 TABLET | Refills: 0 | Status: SHIPPED | OUTPATIENT
Start: 2019-08-08 | End: 2019-08-20

## 2019-08-08 RX ORDER — FENTANYL CITRATE 50 UG/ML
INJECTION, SOLUTION INTRAMUSCULAR; INTRAVENOUS PRN
Status: DISCONTINUED | OUTPATIENT
Start: 2019-08-08 | End: 2019-08-08

## 2019-08-08 RX ORDER — ONDANSETRON 2 MG/ML
INJECTION INTRAMUSCULAR; INTRAVENOUS PRN
Status: DISCONTINUED | OUTPATIENT
Start: 2019-08-08 | End: 2019-08-08

## 2019-08-08 RX ORDER — ONDANSETRON 4 MG/1
4 TABLET, ORALLY DISINTEGRATING ORAL EVERY 30 MIN PRN
Status: CANCELLED | OUTPATIENT
Start: 2019-08-08

## 2019-08-08 RX ORDER — DOXYCYCLINE 100 MG/10ML
100 INJECTION, POWDER, LYOPHILIZED, FOR SOLUTION INTRAVENOUS
Status: COMPLETED | OUTPATIENT
Start: 2019-08-08 | End: 2019-08-08

## 2019-08-08 RX ORDER — PROPOFOL 10 MG/ML
INJECTION, EMULSION INTRAVENOUS PRN
Status: DISCONTINUED | OUTPATIENT
Start: 2019-08-08 | End: 2019-08-08

## 2019-08-08 RX ADMIN — ONDANSETRON 4 MG: 2 INJECTION INTRAMUSCULAR; INTRAVENOUS at 13:14

## 2019-08-08 RX ADMIN — FENTANYL CITRATE 25 MCG: 50 INJECTION, SOLUTION INTRAMUSCULAR; INTRAVENOUS at 13:00

## 2019-08-08 RX ADMIN — MIDAZOLAM 2 MG: 1 INJECTION INTRAMUSCULAR; INTRAVENOUS at 12:30

## 2019-08-08 RX ADMIN — PROPOFOL 100 MCG/KG/MIN: 10 INJECTION, EMULSION INTRAVENOUS at 12:36

## 2019-08-08 RX ADMIN — PROPOFOL 20 MG: 10 INJECTION, EMULSION INTRAVENOUS at 12:39

## 2019-08-08 RX ADMIN — PROPOFOL 30 MG: 10 INJECTION, EMULSION INTRAVENOUS at 12:36

## 2019-08-08 RX ADMIN — DOXYCYCLINE 100 MG: 100 INJECTION, POWDER, LYOPHILIZED, FOR SOLUTION INTRAVENOUS at 12:33

## 2019-08-08 RX ADMIN — PROPOFOL 20 MG: 10 INJECTION, EMULSION INTRAVENOUS at 12:43

## 2019-08-08 RX ADMIN — SCOPALAMINE 1 PATCH: 1 PATCH, EXTENDED RELEASE TRANSDERMAL at 10:36

## 2019-08-08 RX ADMIN — LIDOCAINE HYDROCHLORIDE 21 ML: 10 INJECTION, SOLUTION INFILTRATION; PERINEURAL at 12:53

## 2019-08-08 RX ADMIN — KETOROLAC TROMETHAMINE 30 MG: 30 INJECTION, SOLUTION INTRAMUSCULAR at 13:13

## 2019-08-08 RX ADMIN — DEXAMETHASONE SODIUM PHOSPHATE 8 MG: 4 INJECTION, SOLUTION INTRAMUSCULAR; INTRAVENOUS at 12:48

## 2019-08-08 RX ADMIN — MIDAZOLAM 2 MG: 1 INJECTION INTRAMUSCULAR; INTRAVENOUS at 12:46

## 2019-08-08 RX ADMIN — SODIUM CHLORIDE, POTASSIUM CHLORIDE, SODIUM LACTATE AND CALCIUM CHLORIDE: 600; 310; 30; 20 INJECTION, SOLUTION INTRAVENOUS at 12:25

## 2019-08-08 ASSESSMENT — MIFFLIN-ST. JEOR: SCORE: 1226.75

## 2019-08-08 NOTE — OP NOTE
"OBGYN Operative Note  [facility]    Patient: Jenny Caldwell   : 1985   MRN: [id number]     Date of Service: 19     Pre-operative diagnosis:  1. Missed     Post-operative diagnosis:  1. Same, s/p procedure    Procedure:   1. Exam under anesthesia  2. Suction dilation and curettage     Surgeon: Aria De La Cruz MD  Assistants: Noemí Woods MD PGY-4    Anesthesia: Local with MAC    EBL: 30 mL  Urine: not emptied  Fluids: 700 mL cystalloid    Specimens: products of conception   Complications: none    Findings: Normal external female genitalia.  On bimanual, small retroverted uterus. Small to moderate amount of products on suction. Gritty texture at end of case.     Indications: Jenny Caldwell is a 34 year old  who presented for a missed  management. \"Had a positive UPT at home at the end of . She started bleeding one week later and went to [facility]. She was seen on  for bleeding and had a hcg level of 16,284. ultrasound on that day showed CRL at 6w2d with no cardiac activity.  Hcg continued to downtrend and pelvic ultrasound  showed empty gestational sac with no fetal pole or cardiac activity\". She desired surgical management. Discussed risks, benefits, and alternatives to the procedure including risk of infection, bleeding, and damage to local organs. The patient's questions were answered, understanding confirmed, and the patient signed written informed consent.    Procedure:  The patient was taken to the OR where MAC anesthesia was obtained without complication. She was placed in a dorsal lithotomy position using yellow fin stirrups. Exam under anesthesia revealed the above listed findings. She was then prepped and draped in the usual sterile fashion. After time out was completed, a sterile speculum was inserted into the vagina. The cervix was visualized and the anterior lip of the cervix was injected with 1 ml of 1% lidocaine. The " anterior lip of the cervix was then grasped with a single tooth tenaculum and the 4 and 8 o'clock positions were injected with 1% plain lidocaine for a total of 20 cc's. The cervix was gently serially dilated to size 23 Chinese using durham dilators.   The 7 mm suction curette was inserted and 2 sweeps were done. Sharp curettage revealed a slightly smooth texture on the left side so we attempted to use an 8mm curette after dilating to 27 Kazakh. Dilation was easy but a ridge at the cervix prevented use of the 8mm curette. One more pass with the 7mm curette was done and sharp curettage revealed a gritty texture.  The tenaculum was removed and hemostasis was obtained using pressure and silver nitrate. The speculum was then removed. Patient tolerated the procedure well. She went to the recovery area in stable condition.   Dr. De La Cruz was scrubbed and present for the entire procedure.     Noemí Sosa MD PGY3  Department of OB/GYN  8/8/2019 12:22 PM     Physician Attestation   I was present for the entire procedure. I have reviewed and edited the above operative report to reflect the exact findings and details of the surgical procedure.    Aria De La Cruz. MD

## 2019-08-08 NOTE — ANESTHESIA CARE TRANSFER NOTE
Patient: Jenny Caldwell    Procedure(s):  Suction Dilation and Curettage  (6 Weeks)    Diagnosis: Missed   Diagnosis Additional Information: No value filed.    Anesthesia Type:   MAC     Note:  Airway :Room Air  Patient transferred to:Phase II  Handoff Report: Identifed the Patient, Identified the Reponsible Provider, Reviewed the pertinent medical history, Discussed the surgical course, Reviewed Intra-OP anesthesia mangement and issues during anesthesia, Set expectations for post-procedure period and Allowed opportunity for questions and acknowledgement of understanding      Vitals: (Last set prior to Anesthesia Care Transfer)    CRNA VITALS  2019 1249 - 2019 1327      2019             Resp Rate (observed):  10                Electronically Signed By: Robyn Aldana CRNA, APRN CRNA  2019  1:27 PM
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
## 2019-08-08 NOTE — DISCHARGE INSTRUCTIONS
[facility]   Adult Discharge Orders & Instructions     For 24 hours after surgery    1. Get plenty of rest.  A responsible adult must stay with you for at least 24 hours after you leave the hospital.   2. Do not drive or use heavy equipment.  If you have weakness or tingling, don't drive or use heavy equipment until this feeling goes away.  3. Do not drink alcohol.  4. Avoid strenuous or risky activities.  Ask for help when climbing stairs.   5. You may feel lightheaded.  IF so, sit for a few minutes before standing.  Have someone help you get up.   6. If you have nausea (feel sick to your stomach): Drink only clear liquids such as apple juice, ginger ale, broth or 7-Up.  Rest may also help.  Be sure to drink enough fluids.  Move to a regular diet as you feel able.  7. You may have a slight fever. Call the doctor if your fever is over 100 F (37.7 C) (taken under the tongue) or lasts longer than 24 hours.  8. You may have a dry mouth, a sore throat, muscle aches or trouble sleeping.  These should go away after 24 hours.  9. Do not make important or legal decisions.   Call your doctor for any of the followin.  Signs of infection (fever, growing tenderness at the surgery site, a large amount of drainage or bleeding, severe pain, foul-smelling drainage, redness, swelling).    2. It has been over 8 to 10 hours since surgery and you are still not able to urinate (pass water).    3.  Headache for over 24 hours.    4.  Numbness, tingling or weakness the day after surgery (if you had spinal anesthesia).  To contact a doctor, call ______Dr. De La Cruz @ _______________[phone]  Or after hours call ob/gyn resident on call @ [phone]___________________      Discharge Instructions: Following a Dilation   and Curettage/Dilation and Evacuation    What to expect:    Expect small to moderate amount of vaginal bleeding which should taper off in 4-5 days. It should not be heavier than your regular  menstrual flow.    Do not douche, and use a pad rather than tampons.     No intercourse until bleeding has ceased.    Activity:    Rest the day of surgery. You may resume normal activity the next day.    You may bathe or shower.    Avoid heavy lifting (10-15 lbs) for one week.    Comfort:    The amount of discomfort you can expect is very unpredictable. If you have pain that cannot be controlled with non-aspirin pain relievers or with the prescription you may have received, you should notify your doctor.    Abdominal cramping (like menstrual cramps) or low back ache are common and should not be a cause for concern. You will be drowsy and weak the day of surgery and possibly the following day.    Diet:    You have no restrictions on your diet. Following surgery, drink plenty of fluids and eat a light meal.    Nausea:    The anesthesia medications you received during your surgical procedure may produce some nausea.    If you feel nauseated, stay in bed, keep your head down and try drinking fluids such as Seven-Up, tea or soup.    Notify Physician at once if you experience:    A fever over 100 degrees (a low grade fever under 100 degrees is usual after surgery).    Heavy flow and/or passing large clots. Saturating more than 1 pad per hour for 2 or more hours.     Severe pain or cramps.

## 2019-08-08 NOTE — ANESTHESIA POSTPROCEDURE EVALUATION
Anesthesia POST Procedure Evaluation    Patient: Jenny Caldwell   MRN:     [id number] Gender:   female   Age:    34 year old :      1985        Preoperative Diagnosis: Missed    Procedure(s):  Suction Dilation and Curettage  (6 Weeks)   Postop Comments: No value filed.       Anesthesia Type:  Not documented  MAC    Reportable Event: NO     PAIN: Uncomplicated   Sign Out status: Comfortable, Well controlled pain     PONV: No PONV   Sign Out status:  No Nausea or Vomiting     Neuro/Psych: Uneventful perioperative course   Sign Out Status: Preoperative baseline; Age appropriate mentation     Airway/Resp.: Uneventful perioperative course   Sign Out Status: Non labored breathing, age appropriate RR; Resp. Status within EXPECTED Parameters     CV: Uneventful perioperative course   Sign Out status: Appropriate BP and perfusion indices; Appropriate HR/Rhythm     Disposition:   Sign Out in:  PACU  Disposition:  Phase II; Home  Recovery Course: Uneventful  Follow-Up: Not required           Last Anesthesia Record Vitals:  CRNA VITALS  2019 1249 - 2019 1324      2019             Resp Rate (observed):  1  (Abnormal)           Last PACU Vitals:  Vitals Value Taken Time   BP     Temp     Pulse     Resp     SpO2     Temp src     NIBP 104/57 2019  1:12 PM   Pulse 80 2019  1:17 PM   SpO2 99 % 2019  1:17 PM   Resp     Temp 36.4  C (97.5  F) 2019  1:11 PM   Ht Rate 71 2019  1:12 PM   Temp 2           Electronically Signed By: Saskia Lee MD, 2019, 1:24 PM

## 2019-08-16 LAB — COPATH REPORT: NORMAL

## 2019-08-20 ENCOUNTER — OFFICE VISIT (OUTPATIENT)
Dept: OBGYN | Facility: CLINIC | Age: 34
End: 2019-08-20
Payer: COMMERCIAL

## 2019-08-20 VITALS
SYSTOLIC BLOOD PRESSURE: 129 MMHG | HEART RATE: 81 BPM | WEIGHT: 135.2 LBS | BODY MASS INDEX: 26.55 KG/M2 | HEIGHT: 60 IN | DIASTOLIC BLOOD PRESSURE: 77 MMHG | OXYGEN SATURATION: 98 %

## 2019-08-20 DIAGNOSIS — O03.9 MISCARRIAGE: ICD-10-CM

## 2019-08-20 DIAGNOSIS — Z30.09 STERILIZATION CONSULT: Primary | ICD-10-CM

## 2019-08-20 LAB — HCG UR QL: NEGATIVE

## 2019-08-20 PROCEDURE — 81025 URINE PREGNANCY TEST: CPT | Performed by: OBSTETRICS & GYNECOLOGY

## 2019-08-20 PROCEDURE — 99214 OFFICE O/P EST MOD 30 MIN: CPT | Mod: 24 | Performed by: OBSTETRICS & GYNECOLOGY

## 2019-08-20 ASSESSMENT — MIFFLIN-ST. JEOR: SCORE: 1221.01

## 2019-08-20 NOTE — PROGRESS NOTES
"Chief Complaint   Patient presents with     Surgical Followup       Initial /77 (BP Location: Left arm, Patient Position: Sitting, Cuff Size: Adult Regular)   Pulse 81   Ht 1.518 m (4' 11.76\")   Wt 61.3 kg (135 lb 3.2 oz)   SpO2 98%   Breastfeeding? No   BMI 26.61 kg/m   Estimated body mass index is 26.61 kg/m  as calculated from the following:    Height as of this encounter: 1.518 m (4' 11.76\").    Weight as of this encounter: 61.3 kg (135 lb 3.2 oz).  BP completed using cuff size: regular    Questioned patient about current smoking habits.  Pt. has never smoked.          The following HM Due: NONE      The following patient reported/Care Every where data was sent to:  P ABSTRACT QUALITY INITIATIVES [93214]  n/a      n/a and patient has appointment for today        Jenny Caldwell is here for follow up after her D&C 2 wks ago, for an SAB.  The procedure was uncomplicated and the final path revealed chorionic villi.  She has no significant complaints today.  Emotionally she is appropriate given the circumstances.  She is not planning on getting pregnant again soon.  She denies fever, bleeding, pain or unusual discharge.    We discussed contraception and she wants to get a tubal ligation.   She has older teenage children and is done having babies. This pregnancy was a surprise.       Collected: 2019   Received: 2019   Reported: 2019 17:20   Ordering Phy(s): NAN HENSON     For improved result formatting, select 'View Enhanced Report Format' under    Linked Documents section.     SPECIMEN(S):   Products of conception     FINAL DIAGNOSIS:   PRODUCTS OF CONCEPTION:   - Chorionic villi decidua and secretory endometrium, consistent with   products of intrauterine conception   - No fetal parts identified        EXAM:  Blood pressure 129/77, pulse 81, height 1.518 m (4' 11.76\"), weight 61.3 kg (135 lb 3.2 oz), SpO2 98 %, not currently breastfeeding.  BMI= Body mass index is 26.61 " kg/m .  No LMP recorded.  General - pleasant female in no acute distress.  Neurological - alert and oriented X 3  Psychiatric - normal mood and affect    Results for orders placed or performed in visit on 08/20/19   HCG Qual, Urine ([id number])   Result Value Ref Range    HCG Qual Urine Negative NEG^Negative        No other physical examination was performed today as we spent over 50% of today's 25 minute visit in face-to-face discussion and counseling about post op recovery and permanent sterilization.      ASSESSMENT:  S/p D&C for SAB  Desires permanent contraception  -- requesting to proceed with scheduling laparoscopic bilateral salpingectomy.     PLAN:  Postop recovery from D & C reviewed.  She is done bleeding now. Took several days to have the anesthetic meds wear off.   circumstances of the pregnancy loss reviewed and emotional support given  mood is appropriate  discussed need for negative UPT    I have discussed the laparoscopic bilateral salpingectomy sterilization procedure with her.  She understands that it is permanent and irreversible.     Surgical request placed.     Aria De La Cruz MD

## 2019-08-21 ENCOUNTER — TELEPHONE (OUTPATIENT)
Dept: OBGYN | Facility: CLINIC | Age: 34
End: 2019-08-21

## 2019-08-28 ENCOUNTER — HOSPITAL ENCOUNTER (OUTPATIENT)
Facility: CLINIC | Age: 34
End: 2019-08-28
Attending: OBSTETRICS & GYNECOLOGY | Admitting: OBSTETRICS & GYNECOLOGY
Payer: COMMERCIAL

## 2019-08-28 NOTE — TELEPHONE ENCOUNTER
Type of surgery: gyn  Location of surgery: [facility] OR  Date and time of surgery: 10/23/19 730a  Surgeon: Jono  Pre-Op Appt Date: tbd  Post-Op Appt Date: tbd   Packet sent out: Yes  Pre-cert/Authorization completed:  No  Date: 8/28/2019    Evelyne LAWRENCE, Surgery Coordinator

## 2019-09-23 RX ORDER — CEFAZOLIN SODIUM 2 G/100ML
2 INJECTION, SOLUTION INTRAVENOUS
Status: CANCELLED | OUTPATIENT
Start: 2019-10-23

## 2019-09-23 RX ORDER — CEFAZOLIN SODIUM 1 G/3ML
1 INJECTION, POWDER, FOR SOLUTION INTRAMUSCULAR; INTRAVENOUS SEE ADMIN INSTRUCTIONS
Status: CANCELLED | OUTPATIENT
Start: 2019-10-23

## 2019-10-08 ENCOUNTER — OFFICE VISIT (OUTPATIENT)
Dept: FAMILY MEDICINE | Facility: CLINIC | Age: 34
End: 2019-10-08
Payer: COMMERCIAL

## 2019-10-08 VITALS
HEART RATE: 64 BPM | HEIGHT: 60 IN | RESPIRATION RATE: 22 BRPM | OXYGEN SATURATION: 99 % | TEMPERATURE: 98.8 F | SYSTOLIC BLOOD PRESSURE: 110 MMHG | DIASTOLIC BLOOD PRESSURE: 60 MMHG | BODY MASS INDEX: 26.62 KG/M2

## 2019-10-08 DIAGNOSIS — Z23 NEED FOR PROPHYLACTIC VACCINATION AND INOCULATION AGAINST INFLUENZA: ICD-10-CM

## 2019-10-08 DIAGNOSIS — M54.16 LUMBAR RADICULOPATHY: Primary | ICD-10-CM

## 2019-10-08 PROCEDURE — 99214 OFFICE O/P EST MOD 30 MIN: CPT | Mod: 25 | Performed by: PHYSICIAN ASSISTANT

## 2019-10-08 PROCEDURE — 90471 IMMUNIZATION ADMIN: CPT | Performed by: PHYSICIAN ASSISTANT

## 2019-10-08 PROCEDURE — 90686 IIV4 VACC NO PRSV 0.5 ML IM: CPT | Performed by: PHYSICIAN ASSISTANT

## 2019-10-08 RX ORDER — PREDNISONE 20 MG/1
TABLET ORAL
Qty: 20 TABLET | Refills: 0 | Status: SHIPPED | OUTPATIENT
Start: 2019-10-08 | End: 2019-10-08

## 2019-10-08 RX ORDER — IBUPROFEN 600 MG/1
600 TABLET, FILM COATED ORAL 4 TIMES DAILY PRN
COMMUNITY
Start: 2015-07-23 | End: 2021-11-05

## 2019-10-08 RX ORDER — CYCLOBENZAPRINE HCL 10 MG
10 TABLET ORAL 3 TIMES DAILY PRN
COMMUNITY
Start: 2019-10-07 | End: 2021-11-05

## 2019-10-08 RX ORDER — LIDOCAINE 4 G/G
1 PATCH TOPICAL EVERY 24 HOURS
COMMUNITY
End: 2021-11-05

## 2019-10-08 RX ORDER — PREDNISONE 20 MG/1
TABLET ORAL
Qty: 20 TABLET | Refills: 0 | Status: SHIPPED | OUTPATIENT
Start: 2019-10-08 | End: 2021-11-05

## 2019-10-08 RX ORDER — METHYLPREDNISOLONE 4 MG
TABLET, DOSE PACK ORAL
COMMUNITY
Start: 2019-10-07 | End: 2021-11-05

## 2019-10-08 RX ORDER — HYDROCODONE BITARTRATE AND ACETAMINOPHEN 5; 325 MG/1; MG/1
1 TABLET ORAL EVERY 6 HOURS PRN
Qty: 10 TABLET | Refills: 0 | Status: SHIPPED | OUTPATIENT
Start: 2019-10-08 | End: 2019-10-11

## 2019-10-08 ASSESSMENT — PAIN SCALES - GENERAL: PAINLEVEL: WORST PAIN (10)

## 2019-10-08 NOTE — LETTER
[address]  [facility] [address]  Phone: [phone]    October 8, 2019        Jenny Caldwell  [address] [facility] [address]          To whom it may concern:    RE: Jenny Caldwell    Patient was seen and treated today at our clinic.  Patient may return to work 10/10/2019 with the following:  Light duty-unable to bend, stoop or twist or lift over 10 lbs until 10/24/2019  Please contact me for questions or concerns.      Sincerely,        Vic Proctor PA-C

## 2019-10-08 NOTE — PROGRESS NOTES
"Subjective     Jenny Caldwell is a 34 year old female who presents to clinic today with boyfriend (Reymundo) for the following health issues:    HPI   ED/UC Followup:    Facility:  [facility]  Date of visit: 10/7/2019  Reason for visit: back pain  Current Status: pain is getting worse      Patient was seen in the UC yesterday.  Notes and results reviewed.  Patient notes that pain is progressing.      Review of Systems   ROS COMP: Constitutional, HEENT, cardiovascular, pulmonary, gi and gu systems are negative, except as otherwise noted.      Objective    /60   Pulse 64   Temp 98.8  F (37.1  C) (Oral)   Resp (!) 64   Ht 1.518 m (4' 11.76\")   SpO2 99%   BMI 26.62 kg/m    Body mass index is 26.62 kg/m .  Physical Exam     Total visit time is 25 Minutes with > 20 Minutes spent in care and consultation regarding LBP with medicaiton management, referral and follow up plan.      Diagnostic Test Results:  Labs reviewed in Epic        Assessment & Plan     1. Lumbar radiculopathy  - PHYSICAL THERAPY REFERRAL; Future  - predniSONE (DELTASONE) 20 MG tablet; Take 3 tabs by mouth daily x 3 days, then 2 tabs daily x 3 days, then 1 tab daily x 3 days, then 1/2 tab daily x 3 days.  Dispense: 20 tablet; Refill: 0  - HYDROcodone-acetaminophen (NORCO) 5-325 MG tablet; Take 1 tablet by mouth every 6 hours as needed for severe pain  Dispense: 10 tablet; Refill: 0     Use medication as directed.  Work restrictions drafted.   Follow up per Physical Therapy  Patient education materials dispensed and reviewed.  Follow up if symptoms should persist, change or worsen.  Patient amenable to this follow up plan.   No follow-ups on file.    Vic Proctor PA-C  [facility]      "

## 2019-10-08 NOTE — PATIENT INSTRUCTIONS
Patient Education     Possible Causes of Low Back or Leg Pain    BIG: The symptoms in your back or leg may be due to pressure on a nerve. This pressure may be caused by a damaged disk or by abnormal bone growth. Either way, you may feel pain, burning, tingling, or numbness. If you have pressure on a nerve that connects to the sciatic nerve, pain may shoot down your leg.    Pressure from the disk  Constant wear and tear can weaken a disk over time and cause back pain. The disk can then be damaged by a sudden movement or injury. If its soft center starts to bulge, the disk may press on a nerve. Or the outside of the disk may tear, and the soft center may squeeze through and pinch a nerve.    Pressure from bone  As a disk wears out, the vertebrae right above and below the disk start to touch. This can put pressure on a nerve. Often, abnormal bone (called bone spurs) grows where the vertebrae rub against each other. This can cause the foramen or the spinal canal to narrow (called stenosis) and press against a nerve.  Date Last Reviewed: 3/1/2018    [id number] [employer]. [address] [place] [id number]. All rights reserved. This information is not intended as a substitute for professional medical care. Always follow your healthcare professional's instructions.

## 2019-10-09 ENCOUNTER — NURSE TRIAGE (OUTPATIENT)
Dept: NURSING | Facility: CLINIC | Age: 34
End: 2019-10-09

## 2019-10-10 ENCOUNTER — TELEPHONE (OUTPATIENT)
Dept: FAMILY MEDICINE | Facility: CLINIC | Age: 34
End: 2019-10-10

## 2019-10-10 DIAGNOSIS — M54.16 LUMBAR RADICULOPATHY: ICD-10-CM

## 2019-10-10 RX ORDER — HYDROCODONE BITARTRATE AND ACETAMINOPHEN 5; 325 MG/1; MG/1
1 TABLET ORAL EVERY 6 HOURS PRN
Qty: 10 TABLET | Refills: 0 | Status: CANCELLED | OUTPATIENT
Start: 2019-10-10

## 2019-10-10 NOTE — TELEPHONE ENCOUNTER
RX [place] ([place]) reviewed:  reviewed- no concerns    [place]:  [email or web address]    Lelia Link RN

## 2019-10-10 NOTE — TELEPHONE ENCOUNTER
Controlled Substance Refill Request for HYDROcodone-acetaminophen (NORCO) 5-325 MG tablet  Problem List Complete:  No     PROVIDER TO CONSIDER COMPLETION OF PROBLEM LIST AND OVERVIEW/CONTROLLED SUBSTANCE AGREEMENT    Last Written Prescription Date:  10/08/2019  Last Fill Quantity: 10,   # refills: 0    THE MOST RECENT OFFICE VISIT MUST BE WITHIN THE PAST 3 MONTHS. AT LEAST ONE FACE TO FACE VISIT MUST OCCUR EVERY 6 MONTHS. ADDITIONAL VISITS CAN BE VIRTUAL.  (THIS STATEMENT SHOULD BE DELETED.)    Last Office Visit with [facility] primary care provider: 10/08/2019    Future Office visit:     Controlled substance agreement:   Encounter-Level CSA:    There are no encounter-level csa.     Patient-Level CSA:    There are no patient-level csa.         Last Urine Drug Screen: No results found for: CDAUT, No results found for: COMDAT, No results found for: THC13, PCP13, COC13, MAMP13, OPI13, AMP13, BZO13, TCA13, MTD13, BAR13, OXY13, PPX13, BUP13     Processing:  Staff will hand deliver Rx to on-site pharmacy     [email or web address]       checked in past 3 months?  No, route to RN
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
## 2019-10-11 NOTE — TELEPHONE ENCOUNTER
reviewed. No concerns.   Last dispensed 10/8/19 #10 for a 2 day supply.    Alicia Blair RN  [facility]

## 2019-10-11 NOTE — TELEPHONE ENCOUNTER
Patient is in pain and is checking on status of previous message and request for medication. Please call asap to advise. Thank you.

## 2019-10-11 NOTE — TELEPHONE ENCOUNTER
Requested Prescriptions   Pending Prescriptions Disp Refills     HYDROcodone-acetaminophen (NORCO) 5-325 MG tablet [Pharmacy Med Name: HYDROCODONE/APAP 5-325MG TAB]        Last Written Prescription Date:  10/08/19  Last Fill Quantity: 10,   # refills: 0  Last Office Visit: 10/08/19-[facility]  Future Office visit:       Routing refill request to provider for review/approval because:  Drug not on the [facility], [facility] or [facility] refill protocol or controlled substance 10 tablet 0     Sig: TAKE ONE TABLET BY MOUTH EVERY 6 HOURS AS NEEDED FOR SEVERE PAIN       There is no refill protocol information for this order        No

## 2019-10-14 DIAGNOSIS — Z30.2 ENCOUNTER FOR STERILIZATION: ICD-10-CM

## 2019-10-14 DIAGNOSIS — Z01.818 PRE-OP EXAM: Primary | ICD-10-CM

## 2019-10-14 NOTE — TELEPHONE ENCOUNTER
I don't see any indication on the problem list that this is a chronic medication that should be refilled.  Also, there is no CSA.  This will need to wait for Vic to review.  Please notify.

## 2019-10-15 NOTE — TELEPHONE ENCOUNTER
No refills of narcotic medications at this time without follow up.  If Patient isn't responding to treatment have her follow up in clinic for repeat assessment.  Thank you.  Thien LOVE

## 2019-10-15 NOTE — TELEPHONE ENCOUNTER
Left detailed message for patient with provider message below., gave scheduling line if she would like to make appointment or call back RN hotline if any questions at [phone].  Lelia Rowland RN

## 2019-10-16 RX ORDER — HYDROCODONE BITARTRATE AND ACETAMINOPHEN 5; 325 MG/1; MG/1
TABLET ORAL
Qty: 10 TABLET | Refills: 0 | OUTPATIENT
Start: 2019-10-16

## 2019-10-18 NOTE — TELEPHONE ENCOUNTER
2nd attempt to reach.  Left message on answering machine for patient to call back to the RN hotline at [phone].    Alicia Blair RN  [facility]

## 2020-02-24 ENCOUNTER — HEALTH MAINTENANCE LETTER (OUTPATIENT)
Age: 35
End: 2020-02-24

## 2020-12-13 ENCOUNTER — HEALTH MAINTENANCE LETTER (OUTPATIENT)
Age: 35
End: 2020-12-13

## 2021-04-17 ENCOUNTER — HEALTH MAINTENANCE LETTER (OUTPATIENT)
Age: 36
End: 2021-04-17

## 2021-04-30 ENCOUNTER — OFFICE VISIT (OUTPATIENT)
Dept: FAMILY MEDICINE | Facility: CLINIC | Age: 36
End: 2021-04-30
Payer: COMMERCIAL

## 2021-04-30 VITALS
BODY MASS INDEX: 25.95 KG/M2 | HEIGHT: 60 IN | DIASTOLIC BLOOD PRESSURE: 62 MMHG | WEIGHT: 132.2 LBS | TEMPERATURE: 98.4 F | SYSTOLIC BLOOD PRESSURE: 100 MMHG | HEART RATE: 66 BPM | OXYGEN SATURATION: 99 %

## 2021-04-30 DIAGNOSIS — Z12.4 CERVICAL CANCER SCREENING: ICD-10-CM

## 2021-04-30 DIAGNOSIS — N92.6 IRREGULAR PERIODS: Primary | ICD-10-CM

## 2021-04-30 DIAGNOSIS — Z12.31 ENCOUNTER FOR SCREENING MAMMOGRAM FOR BREAST CANCER: ICD-10-CM

## 2021-04-30 DIAGNOSIS — Z80.3 FAMILY HISTORY OF BREAST CANCER: ICD-10-CM

## 2021-04-30 LAB
ALBUMIN SERPL-MCNC: 3.8 G/DL (ref 3.4–5)
ALP SERPL-CCNC: 64 U/L (ref 40–150)
ALT SERPL W P-5'-P-CCNC: 18 U/L (ref 0–50)
ANION GAP SERPL CALCULATED.3IONS-SCNC: 4 MMOL/L (ref 3–14)
AST SERPL W P-5'-P-CCNC: 15 U/L (ref 0–45)
BASOPHILS # BLD AUTO: 0 10E9/L (ref 0–0.2)
BASOPHILS NFR BLD AUTO: 0.5 %
BILIRUB SERPL-MCNC: 0.6 MG/DL (ref 0.2–1.3)
BUN SERPL-MCNC: 15 MG/DL (ref 7–30)
CALCIUM SERPL-MCNC: 8.7 MG/DL (ref 8.5–10.1)
CHLORIDE SERPL-SCNC: 110 MMOL/L (ref 94–109)
CO2 SERPL-SCNC: 25 MMOL/L (ref 20–32)
CREAT SERPL-MCNC: 0.53 MG/DL (ref 0.52–1.04)
DIFFERENTIAL METHOD BLD: ABNORMAL
EOSINOPHIL # BLD AUTO: 0.1 10E9/L (ref 0–0.7)
EOSINOPHIL NFR BLD AUTO: 0.8 %
ERYTHROCYTE [DISTWIDTH] IN BLOOD BY AUTOMATED COUNT: 12.5 % (ref 10–15)
ESTRADIOL SERPL-MCNC: 67 PG/ML
FSH SERPL-ACNC: 5.6 IU/L
GFR SERPL CREATININE-BSD FRML MDRD: >90 ML/MIN/{1.73_M2}
GLUCOSE SERPL-MCNC: 85 MG/DL (ref 70–99)
HBA1C MFR BLD: 5.3 % (ref 0–5.6)
HCT VFR BLD AUTO: 39.2 % (ref 35–47)
HGB BLD-MCNC: 12.8 G/DL (ref 11.7–15.7)
LH SERPL-ACNC: 1.8 IU/L
LYMPHOCYTES # BLD AUTO: 2 10E9/L (ref 0.8–5.3)
LYMPHOCYTES NFR BLD AUTO: 32.1 %
MCH RBC QN AUTO: 32.9 PG (ref 26.5–33)
MCHC RBC AUTO-ENTMCNC: 32.7 G/DL (ref 31.5–36.5)
MCV RBC AUTO: 101 FL (ref 78–100)
MONOCYTES # BLD AUTO: 0.5 10E9/L (ref 0–1.3)
MONOCYTES NFR BLD AUTO: 8.6 %
NEUTROPHILS # BLD AUTO: 3.6 10E9/L (ref 1.6–8.3)
NEUTROPHILS NFR BLD AUTO: 58 %
PLATELET # BLD AUTO: 363 10E9/L (ref 150–450)
POTASSIUM SERPL-SCNC: 3.9 MMOL/L (ref 3.4–5.3)
PROT SERPL-MCNC: 7.3 G/DL (ref 6.8–8.8)
RBC # BLD AUTO: 3.89 10E12/L (ref 3.8–5.2)
SODIUM SERPL-SCNC: 139 MMOL/L (ref 133–144)
TSH SERPL DL<=0.005 MIU/L-ACNC: 0.94 MU/L (ref 0.4–4)
WBC # BLD AUTO: 6.3 10E9/L (ref 4–11)

## 2021-04-30 PROCEDURE — G0145 SCR C/V CYTO,THINLAYER,RESCR: HCPCS | Performed by: FAMILY MEDICINE

## 2021-04-30 PROCEDURE — 80050 GENERAL HEALTH PANEL: CPT | Performed by: FAMILY MEDICINE

## 2021-04-30 PROCEDURE — 83001 ASSAY OF GONADOTROPIN (FSH): CPT | Performed by: FAMILY MEDICINE

## 2021-04-30 PROCEDURE — 83002 ASSAY OF GONADOTROPIN (LH): CPT | Performed by: FAMILY MEDICINE

## 2021-04-30 PROCEDURE — 99214 OFFICE O/P EST MOD 30 MIN: CPT | Performed by: FAMILY MEDICINE

## 2021-04-30 PROCEDURE — 36415 COLL VENOUS BLD VENIPUNCTURE: CPT | Performed by: FAMILY MEDICINE

## 2021-04-30 PROCEDURE — 82670 ASSAY OF TOTAL ESTRADIOL: CPT | Performed by: FAMILY MEDICINE

## 2021-04-30 PROCEDURE — 83036 HEMOGLOBIN GLYCOSYLATED A1C: CPT | Performed by: FAMILY MEDICINE

## 2021-04-30 PROCEDURE — 87624 HPV HI-RISK TYP POOLED RSLT: CPT | Performed by: FAMILY MEDICINE

## 2021-04-30 ASSESSMENT — MIFFLIN-ST. JEOR: SCORE: 1198.67

## 2021-04-30 ASSESSMENT — PATIENT HEALTH QUESTIONNAIRE - PHQ9: SUM OF ALL RESPONSES TO PHQ QUESTIONS 1-9: 9

## 2021-04-30 NOTE — PROGRESS NOTES
Assessment & Plan     Irregular periods  - Check US and labs below   - Possibly perimenopause (mom started menopause early)  - Follicle stimulating hormone  - Lutropin  - CBC with platelets and differential  - Hemoglobin A1c  - Comprehensive metabolic panel (BMP + Alb, Alk Phos, ALT, AST, Total. Bili, TP)  - Estradiol  - TSH with free T4 reflex  - US Pelvic Complete with Transvaginal; Future    Encounter for screening mammogram for breast cancer  Family history of breast cancer  - Mom  of breast cancer age 50   - MA Screen Bilateral w/Jaspal; Future    Cervical cancer screening  - Pap imaged thin layer screen with HPV - recommended age 30 - 65 years (select HPV order below)  - HPV High Risk Types DNA Cervical    Maggi Steinberg, DO  [facility]    ===================================================================    Subjective   Jenny is a 36 year old who presents for the following health issues  accompanied by her daughter:    HPI     Patient would like to discuss about menstrual cycle:  She states she would bleed for a day but it's just spotting.  -Past month she has been having heavy period and would bleeding though everything  -At times patient feels sick and nauseous which is something new.  -Mother went through menopause at an earlier age.  -She feels depressed, groggy and just doesn't feel right, possibly due to hormones.  -Not able to sleep throughout the night.    Patient first started to notice changes in her periods about 4 months ago.  Initially had more light spotting instead of what is a normal period for her.  Then the next month she had a very heavy period with clots.  The month after that it was light again.  This month it was heavy again.  She sometimes gets quick stabs of pelvic pain in her LLQ that resolve quickly.  Also has noticed mood changes and sleeping issues.  Occasionally gets hot flashes, wakes sweating.      Patient notes that her periods haven't been  "\"normal\" since having her last child.  Notes irregularity is \"normal\" for her but she generally doesn't have the heavy periods that she's experiencing recently.      Pts mom started menopause in late 30s/early 40s.  Her mom  of breast cancer at age 50.      Review of Systems   Constitutional, HEENT, cardiovascular, pulmonary, gi and gu systems are negative, except as otherwise noted.      Objective    /62 (BP Location: Right arm, Patient Position: Chair, Cuff Size: Adult Regular)   Pulse 66   Temp 98.4  F (36.9  C) (Oral)   Ht 1.52 m (4' 11.84\")   Wt 60 kg (132 lb 3.2 oz)   LMP 2021 (Exact Date)   SpO2 99%   BMI 25.95 kg/m    Body mass index is 25.95 kg/m .  Physical Exam   GENERAL: healthy, alert and no distress   (female): normal female external genitalia, normal urethral meatus, vaginal mucosa, normal cervix/adnexa/uterus without masses or discharge          "

## 2021-05-04 LAB
COPATH REPORT: NORMAL
PAP: NORMAL

## 2021-05-05 LAB
FINAL DIAGNOSIS: NORMAL
HPV HR 12 DNA CVX QL NAA+PROBE: NEGATIVE
HPV16 DNA SPEC QL NAA+PROBE: NEGATIVE
HPV18 DNA SPEC QL NAA+PROBE: NEGATIVE
SPECIMEN DESCRIPTION: NORMAL
SPECIMEN SOURCE CVX/VAG CYTO: NORMAL

## 2021-05-10 NOTE — NURSING NOTE
"Chief Complaint   Patient presents with     Confirmation Of Pregnancy       Initial /70 mmHg  Pulse 67  Temp(Src) 99  F (37.2  C) (Oral)  Ht 4' 11.84\" (1.52 m)  Wt 115 lb 8 oz (52.39 kg)  BMI 22.68 kg/m2  SpO2 97%  LMP  (LMP Unknown) Estimated body mass index is 22.68 kg/(m^2) as calculated from the following:    Height as of this encounter: 4' 11.84\" (1.52 m).    Weight as of this encounter: 115 lb 8 oz (52.39 kg).  BP completed using cuff size: regular    " Followed protocol

## 2021-05-17 ENCOUNTER — ANCILLARY PROCEDURE (OUTPATIENT)
Dept: ULTRASOUND IMAGING | Facility: CLINIC | Age: 36
End: 2021-05-17
Attending: FAMILY MEDICINE
Payer: COMMERCIAL

## 2021-05-17 DIAGNOSIS — N92.6 IRREGULAR PERIODS: ICD-10-CM

## 2021-05-25 ENCOUNTER — ANCILLARY PROCEDURE (OUTPATIENT)
Dept: MAMMOGRAPHY | Facility: CLINIC | Age: 36
End: 2021-05-25
Attending: FAMILY MEDICINE
Payer: COMMERCIAL

## 2021-05-25 DIAGNOSIS — Z12.31 ENCOUNTER FOR SCREENING MAMMOGRAM FOR BREAST CANCER: ICD-10-CM

## 2021-05-25 DIAGNOSIS — Z80.3 FAMILY HISTORY OF BREAST CANCER: ICD-10-CM

## 2021-05-25 PROCEDURE — 77063 BREAST TOMOSYNTHESIS BI: CPT | Mod: TC | Performed by: RADIOLOGY

## 2021-05-25 PROCEDURE — 77067 SCR MAMMO BI INCL CAD: CPT | Mod: TC | Performed by: RADIOLOGY

## 2021-09-26 ENCOUNTER — HEALTH MAINTENANCE LETTER (OUTPATIENT)
Age: 36
End: 2021-09-26

## 2021-11-05 ENCOUNTER — OFFICE VISIT (OUTPATIENT)
Dept: FAMILY MEDICINE | Facility: CLINIC | Age: 36
End: 2021-11-05
Payer: COMMERCIAL

## 2021-11-05 VITALS
DIASTOLIC BLOOD PRESSURE: 80 MMHG | TEMPERATURE: 98.1 F | SYSTOLIC BLOOD PRESSURE: 138 MMHG | RESPIRATION RATE: 16 BRPM | HEART RATE: 67 BPM | BODY MASS INDEX: 23.25 KG/M2 | WEIGHT: 118.4 LBS | HEIGHT: 60 IN | OXYGEN SATURATION: 99 %

## 2021-11-05 DIAGNOSIS — N93.9 ABNORMAL UTERINE BLEEDING: ICD-10-CM

## 2021-11-05 DIAGNOSIS — F43.23 ADJUSTMENT DISORDER WITH MIXED ANXIETY AND DEPRESSED MOOD: Primary | ICD-10-CM

## 2021-11-05 DIAGNOSIS — Z23 NEED FOR TD VACCINE: ICD-10-CM

## 2021-11-05 PROCEDURE — 99214 OFFICE O/P EST MOD 30 MIN: CPT | Mod: 25 | Performed by: NURSE PRACTITIONER

## 2021-11-05 PROCEDURE — 90471 IMMUNIZATION ADMIN: CPT | Performed by: NURSE PRACTITIONER

## 2021-11-05 PROCEDURE — 96127 BRIEF EMOTIONAL/BEHAV ASSMT: CPT | Performed by: NURSE PRACTITIONER

## 2021-11-05 PROCEDURE — 90714 TD VACC NO PRESV 7 YRS+ IM: CPT | Performed by: NURSE PRACTITIONER

## 2021-11-05 ASSESSMENT — ANXIETY QUESTIONNAIRES
2. NOT BEING ABLE TO STOP OR CONTROL WORRYING: SEVERAL DAYS
5. BEING SO RESTLESS THAT IT IS HARD TO SIT STILL: MORE THAN HALF THE DAYS
7. FEELING AFRAID AS IF SOMETHING AWFUL MIGHT HAPPEN: MORE THAN HALF THE DAYS
6. BECOMING EASILY ANNOYED OR IRRITABLE: MORE THAN HALF THE DAYS
1. FEELING NERVOUS, ANXIOUS, OR ON EDGE: SEVERAL DAYS
GAD7 TOTAL SCORE: 12
3. WORRYING TOO MUCH ABOUT DIFFERENT THINGS: MORE THAN HALF THE DAYS
IF YOU CHECKED OFF ANY PROBLEMS ON THIS QUESTIONNAIRE, HOW DIFFICULT HAVE THESE PROBLEMS MADE IT FOR YOU TO DO YOUR WORK, TAKE CARE OF THINGS AT HOME, OR GET ALONG WITH OTHER PEOPLE: SOMEWHAT DIFFICULT

## 2021-11-05 ASSESSMENT — PAIN SCALES - GENERAL: PAINLEVEL: NO PAIN (0)

## 2021-11-05 ASSESSMENT — MIFFLIN-ST. JEOR: SCORE: 1135.39

## 2021-11-05 ASSESSMENT — PATIENT HEALTH QUESTIONNAIRE - PHQ9
SUM OF ALL RESPONSES TO PHQ QUESTIONS 1-9: 11
5. POOR APPETITE OR OVEREATING: MORE THAN HALF THE DAYS
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
## 2021-11-05 NOTE — PATIENT INSTRUCTIONS
"Lifestyle Changes to improve Mood    - Take a Vitamin D 3- 4000 units daily     - Mind Over Mood (a workbook)     - Go to bed at 10 pm on school/work nights     - Avoid screens starting at 8 pm    - Exercise at minimum 30 minutes 3-4 times a week     - Eat three meals a day with protein, complex carbs, and a fruit or veggie and two snacks mid morning and mid afternoon           Consider adding Omega 3 supplement to diet.  Omega 3 has been shown to improve attention, cognitive clarity, and mood.  It is recommended the Omega 3 contains 1 gram of EPA (a component of Omega) daily.  [place] Brite (omegabrite.com) or Coromega Max (found on Amazon or https://Soevolved)    Try the option of using Lumosity.com as another means of trying to improve your attention span.    Health/Lifestyle changes which can improve symptoms of anxiety, and depression, and ADHD/ADD:  - Avoid simple carbohydrates at breakfast.  Aim for only complex carbohydrates and lean protein for your morning meal.  - Engage in aerobic exercise 4 times per week for at least 30 minutes.  - Take a high quality multi-vitamin and antioxidant daily in conjunction with balanced nutrition.  - Aim for the high end of daily water intake, around 72 ounces per day.  - Ensure regular meals and snacks to maintain optimal attention.  - A source of information about the relationship between exercise and brain functioning is the book  Spark: The revolutionary new science of exercise and the brain  by Baldev Coley.  - Avoid the use of caffeine in any amount.  - Keep alcohol consumption low, meaning only an occasional drink.  This is especially true when taking antidepressant medications.    Stress / Anxiety  - Recommend doing a free 8 week Mindfulness-Based Stress Reduction (MBSR) Program  o To access the free program, go to [email or web address].html  - Check out [employer] for therapists and see who you \"click with.\"   - Consider a meditation Maria D " if you want, check out Calm or Headspace - I like guided imagery / progressive relaxation    Sleep Hygiene  - Have a regular bedtime and a regular wake-up time  - No matter how tired you feel during the day, avoid napping before your regularly scheduled bedtime.  Napping diminishes your need for sleep and makes it difficult to sleep at night.  - Use your bed for sleeping (and sex) only.  - Make sure your bedroom is quiet and dark.  If noise is a problem, use earplugs.  If the light is a problem, cover your eyes with something to mask the light.  - Exercise regularly and often so that you use up all your energy and sleep the sleep of exhaustion.  Avoid exercising within 2 hours of bedtime.  - Give yourself an hour or so to unwind before you get into bed.  Put aside serious matters and occupy yourself with something light, such as reading.  Consider taking a warm shower an hour before bedtime.  You might also want to try relaxation techniques.  If you are worried or preoccupied, set another time aside for journaling your feelings or writing your problems.  - Do the same thing every night before going to bed to help your body get ready for sleep.  - Have a ritual as your bedtime approaches, such as laying out your clothes for the next day, brushing your teeth, and then reading for 20 minutes.  Soon you will connect these activities with sleeping and they will make you sleepy.  - Avoid caffeine (coffee, tea, caffeinated soft drinks, and chocolate) within 6 hours of bedtime.  These will keep you awake.  - If you can't sleep, get up.  Sit quietly for about 20 minutes, and then return to bed.  Repeat this as many times as needed until you fall asleep.  - Avoid getting upset over your sleeplessness.  A lack of sleep will not harm you.  At worst, you will feel tired the next day.  - Avoid over-the-counter sleeping medication if you can.  Sleeping medications may leave you groggy the next day and require higher and higher  "doses over time.  Once you stop taking sleeping medications, your sleep problems may come back.  At best, sleeping pills are temporary relief, not a cure, and using them may make your sleeping problems worse.  - Invest in a weighted blanket or Sleep Pod-Adult Swaddle Burdette, which provides Deep Touch Pressure Therapy to help calm, relax, and improve sleep.  - Keep alcohol consumption low, meaning only an occasional drink.    Mental Health Referral Recommendations for Therapy    - Jose Eduardo Hyman PhD LP  (For children, adolescents, and young adults, ADD/ADHD, Autism)  [address]  [address] [address]  [phone]  - CONCHITA Medina through [facility]  - Maldonado Aldana PhD, Licensed Psychologist, LMFT through [facility] ([facility] [place] [address].  [phone]   Coaching - individual and parent.  Family/Marital Therapy  - [place], [place].  [phone]   Individual, Family, and Group Psychotherapy  - [place] and you can search for therapists and see who you \"click with.\"       Mental Health Referral Recommendations for Assessment/Testing/Treatment for all ages    - Jose Eduardo Hyman PhD LP  (For children, adolescents, and young adults, ADD/ADHD, Autism)  [address]  [address] [address]  [phone]  - [facility]  - Maldonado Aldana PhD, Licensed Psychologist, LMFT through [facility]) in [place] [id number].  [phone]   Assessment/Testing for Mood Disorders, Learning Disabilities, ADD/ADHD, Complex Issues  - [place], [place].  [phone]   Assessment/Testing for Depression, Anxiety, Bipolar, ADHD/ADD, Learning Disabilities, Autism Spectrum Disorders, Memory Testing, Schizophrenic Disorders      Magnesium glycine or melatonin for " sleep      [facility]     Discharged by : Shanita Veloz, PREM, KINSEY, CNP   Paper scripts provided to patient : none     If you have any questions regarding your visit please contact your care team:     Team Jennifer              Clinic Hours Telephone Number     [address]   7am-7pm  Monday - Thursday   7am-5pm  Fridays  [phone]   (Appointment scheduling available 24/7)     RN Line  [phone] option 2     Urgent Care - [facility] and [place] - 11am-9pm Monday-Friday Saturday-Sunday- 9am-5pm     [place] -   5pm-9pm Monday-Friday Saturday-Sunday- 9am-5pm    [phone] - [facility]    [phone] - [place]     For a Price Quote for your services, please call our [employer] Price Line at [phone].     What options do I have for visits at the clinic other than the traditional office visit?     To expand how we care for you, many of our providers are utilizing electronic visits (e-visits) and telephone visits, when medically appropriate, for interactions with their patients rather than a visit in the clinic. We also offer nurse visits for many medical concerns. Just like any other service, we will bill your insurance company for this type of visit based on time spent on the phone with your provider. Not all insurance companies cover these visits. Please check with your medical insurance if this type of visit is covered. You will be responsible for any charges that are not paid by your insurance.     E-visits via bodaplanes: generally incur a $45.00 fee.     Telephone visits:  Time spent on the phone: *charged based on time that is spent on the phone in increments of 10 minutes. Estimated cost:   5-10 mins $30.00   11-20 mins. $59.00   21-30 mins. $85.00       Use bodaplanes (secure email communication and access to your chart) to send your primary care provider a message or make an appointment. Ask someone on your Team how  to sign up for [email or web address].     As always, Thank you for trusting us with your health care needs!      [facility]:    Scheduling Appointments    [facility] - get your U/S at the [facility] location  Call: [phone]    For Gastroenterology referrals   [facility]   Clinics and Surgery Center, 4th Floor   [address] [place] [address]   Appointments: [phone]    WHERE TO GO FOR CARE?    Clinic    Make an appointment if you:       Are sick (cold, cough, flu, sore throat, earache or in pain).       Have a small injury (sprain, small cut, burn or broken bone).       Need a physical exam, Pap smear, vaccine or prescription refill.       Have questions about your health or medicines.    To reach us:      Call [phone] ([phone]). Open 24 hours every day. (For counseling services, call [phone].)    Log into [email or web address] at [employer]. (Visit [email or web address] to create an account.) Hospital emergency room    An emergency is a serious or life- threatening problem that must be treated right away.    Call 298 or get to the hospital if you have:      Very bad or sudden:            - Chest pain or pressure         - Bleeding         - Head or belly pain         - Dizziness or trouble seeing, walking or                          Speaking      Problems breathing      Blood in your vomit or you are coughing up blood      A major injury (knocked out, loss of a finger or limb, rape, broken bone protruding from skin)    A mental health crisis. (Or call the Mental Health Crisis line at 1-774.103.2247 or Suicide Prevention Hotline at 1-701.867.7752.)    Open 24 hours every day. You don't need an appointment.     Urgent care    Visit urgent care for sickness or small injuries when the clinic is closed. You don't need an appointment. To check hours or find an urgent care near you, visit [email or web address] [email or web address][email or web address]. Online  care    Get online care from [facility] for more than 70 common problems, like colds, allergies and infections. Open 24 hours every day at:   [email or web address]   Need help deciding?    For advice about where to be seen, you may call your clinic and ask to speak with a nurse. We're here for you 24 hours every day.         If you are deaf or hard of hearing, please let us know. We provide many free services including sign language interpreters, oral interpreters, TTYs, telephone amplifiers, note takers and written materials.

## 2021-11-06 ASSESSMENT — ANXIETY QUESTIONNAIRES: GAD7 TOTAL SCORE: 12

## 2022-05-08 ENCOUNTER — HEALTH MAINTENANCE LETTER (OUTPATIENT)
Age: 37
End: 2022-05-08

## 2022-11-17 ENCOUNTER — OFFICE VISIT (OUTPATIENT)
Dept: FAMILY MEDICINE | Facility: CLINIC | Age: 37
End: 2022-11-17
Payer: COMMERCIAL

## 2022-11-17 VITALS
DIASTOLIC BLOOD PRESSURE: 6 MMHG | BODY MASS INDEX: 22.78 KG/M2 | HEART RATE: 129 BPM | TEMPERATURE: 97.9 F | WEIGHT: 116 LBS | SYSTOLIC BLOOD PRESSURE: 98 MMHG | RESPIRATION RATE: 20 BRPM | OXYGEN SATURATION: 97 % | HEIGHT: 60 IN

## 2022-11-17 DIAGNOSIS — J10.1 INFLUENZA A: ICD-10-CM

## 2022-11-17 DIAGNOSIS — J06.9 UPPER RESPIRATORY TRACT INFECTION, UNSPECIFIED TYPE: Primary | ICD-10-CM

## 2022-11-17 DIAGNOSIS — U07.1 INFECTION DUE TO 2019 NOVEL CORONAVIRUS: ICD-10-CM

## 2022-11-17 LAB
FLUAV AG SPEC QL IA: POSITIVE
FLUBV AG SPEC QL IA: NEGATIVE
SARS-COV-2 RNA RESP QL NAA+PROBE: POSITIVE

## 2022-11-17 PROCEDURE — U0003 INFECTIOUS AGENT DETECTION BY NUCLEIC ACID (DNA OR RNA); SEVERE ACUTE RESPIRATORY SYNDROME CORONAVIRUS 2 (SARS-COV-2) (CORONAVIRUS DISEASE [COVID-19]), AMPLIFIED PROBE TECHNIQUE, MAKING USE OF HIGH THROUGHPUT TECHNOLOGIES AS DESCRIBED BY CMS-2020-01-R: HCPCS | Performed by: FAMILY MEDICINE

## 2022-11-17 PROCEDURE — U0005 INFEC AGEN DETEC AMPLI PROBE: HCPCS | Performed by: FAMILY MEDICINE

## 2022-11-17 PROCEDURE — 99213 OFFICE O/P EST LOW 20 MIN: CPT | Mod: CS | Performed by: FAMILY MEDICINE

## 2022-11-17 PROCEDURE — 87804 INFLUENZA ASSAY W/OPTIC: CPT | Performed by: FAMILY MEDICINE

## 2022-11-17 RX ORDER — OSELTAMIVIR PHOSPHATE 75 MG/1
75 CAPSULE ORAL 2 TIMES DAILY
Qty: 10 CAPSULE | Refills: 0 | Status: SHIPPED | OUTPATIENT
Start: 2022-11-17 | End: 2022-11-22

## 2022-11-17 NOTE — LETTER
November 22, 2022      Jenny Caldwell  [address]  [place] [address]        Dear ,    We are writing to inform you of your test results.    Your recent tests did show that you also had COVID in addition to influenza.  At this point, there is no treatment for COVID except rest, fever reduction, and time.  Hopefully, the Tamiflu help with your influenza symptoms.  Please call, or use Sentient, with any questions or concerns.    Resulted Orders   Symptomatic; Yes; 11/14/2022 COVID-19 Virus (Coronavirus) by PCR Nose   Result Value Ref Range    SARS CoV2 PCR Positive (A) Negative      Comment:      POSITIVE: SARS-CoV-2 (COVID-19) RNA detected, presumed positive.    Narrative    Testing was performed using the Bluebridge Digital SARS-CoV-2 Assay on the  Conformia Software Instrument System. Additional information about this  Emergency Use Authorization (EUA) assay can be found via the Lab  Guide. This test should be ordered for the detection of SARS-CoV-2 in  individuals who meet SARS-CoV-2 clinical and/or epidemiological  criteria. Test performance is unknown in asymptomatic patients. This  test is for in vitro diagnostic use under the FDA EUA for  laboratories certified under CLIA to perform high complexity testing.  This test has not been FDA cleared or approved. A negative result  does not rule out the presence of PCR inhibitors in the specimen or  target RNA in concentration below the limit of detection for the  assay. The possibility of a false negative should be considered if  the patient's recent exposure or clinical presentation suggests  COVID-19. This test was validated by the [facility] Infectious  Diseases Diagnostic Laboratory. This laboratory is certified under  the Clinical Laboratory Improvement Amendments of 1988 (CLIA-88) as  qualified to perform high complexity laboratory testing.   Influenza A/B antigen   Result Value Ref Range    Influenza A antigen Positive (A) Negative      Comment:       This is a corrected result. Previous result was Negative on 11/17/2022 at 10:26 AM CST    Influenza B antigen Negative Negative    Narrative    Test results must be correlated with clinical data. If necessary, results should be confirmed by a molecular assay or viral culture.       If you have any questions or concerns, please call the clinic at the number listed above.       Sincerely,      Pastora Pulido MD/mp

## 2022-11-17 NOTE — LETTER
November 21, 2022      Jenny Caldwell  [address]  [place] [address]        Dear ,    We are writing to inform you of your test results.    Your recent tests did show that you also had COVID in addition to influenza.  At this point, there is no treatment for COVID except rest, fever reduction, and time.  Hopefully, the Tamiflu help with your influenza symptoms.  Please call, or use Radcom, with any questions or concerns.       Resulted Orders   Symptomatic; Yes; 11/14/2022 COVID-19 Virus (Coronavirus) by PCR Nose   Result Value Ref Range    SARS CoV2 PCR Positive (A) Negative      Comment:      POSITIVE: SARS-CoV-2 (COVID-19) RNA detected, presumed positive.    Narrative    Testing was performed using the BioLeap SARS-CoV-2 Assay on the  Seeqpod Instrument System. Additional information about this  Emergency Use Authorization (EUA) assay can be found via the Lab  Guide. This test should be ordered for the detection of SARS-CoV-2 in  individuals who meet SARS-CoV-2 clinical and/or epidemiological  criteria. Test performance is unknown in asymptomatic patients. This  test is for in vitro diagnostic use under the FDA EUA for  laboratories certified under CLIA to perform high complexity testing.  This test has not been FDA cleared or approved. A negative result  does not rule out the presence of PCR inhibitors in the specimen or  target RNA in concentration below the limit of detection for the  assay. The possibility of a false negative should be considered if  the patient's recent exposure or clinical presentation suggests  COVID-19. This test was validated by the [facility] Infectious  Diseases Diagnostic Laboratory. This laboratory is certified under  the Clinical Laboratory Improvement Amendments of 1988 (CLIA-88) as  qualified to perform high complexity laboratory testing.   Influenza A/B antigen   Result Value Ref Range    Influenza A antigen Positive (A) Negative      Comment:       This is a corrected result. Previous result was Negative on 11/17/2022 at 10:26 AM CST    Influenza B antigen Negative Negative    Narrative    Test results must be correlated with clinical data. If necessary, results should be confirmed by a molecular assay or viral culture.       If you have any questions or concerns, please call the clinic at the number listed above.       Sincerely,      Pastora Pulido MD/mp

## 2022-11-17 NOTE — PROGRESS NOTES
Assessment & Plan     (J06.9) Upper respiratory tract infection, unspecified type  (primary encounter diagnosis)  Comment: appears ill but not toxic. Maintaining oxygen saturations on room air. No underlying pulmonary issues.   Plan: Symptomatic; Yes; 11/14/2022 COVID-19 Virus         (Coronavirus) by PCR Nose, Influenza A/B         antigen            (J10.1) Influenza A  Comment: positive. Will treat.   Plan: oseltamivir (TAMIFLU) 75 MG capsule            (U07.1) Infection due to 2019 novel coronavirus  Comment: by the time results came back, she was beyond the window of treatment.   Plan: supportive cares.     Results for orders placed or performed in visit on 11/17/22   Symptomatic; Yes; 11/14/2022 COVID-19 Virus (Coronavirus) by PCR Nose     Status: Abnormal    Specimen: Nose; Swab   Result Value Ref Range    SARS CoV2 PCR Positive (A) Negative    Narrative    Testing was performed using the Aptima SARS-CoV-2 Assay on the  TRiQ Instrument System. Additional information about this  Emergency Use Authorization (EUA) assay can be found via the Lab  Guide. This test should be ordered for the detection of SARS-CoV-2 in  individuals who meet SARS-CoV-2 clinical and/or epidemiological  criteria. Test performance is unknown in asymptomatic patients. This  test is for in vitro diagnostic use under the FDA EUA for  laboratories certified under CLIA to perform high complexity testing.  This test has not been FDA cleared or approved. A negative result  does not rule out the presence of PCR inhibitors in the specimen or  target RNA in concentration below the limit of detection for the  assay. The possibility of a false negative should be considered if  the patient's recent exposure or clinical presentation suggests  COVID-19. This test was validated by the [facility]. This laboratory is certified under  the Clinical Laboratory Improvement Amendments of 1988 (CLIA-88)  "as  qualified to perform high complexity laboratory testing.   Influenza A/B antigen     Status: Abnormal    Specimen: Nose; Swab   Result Value Ref Range    Influenza A antigen Positive (A) Negative    Influenza B antigen Negative Negative    Narrative    Test results must be correlated with clinical data. If necessary, results should be confirmed by a molecular assay or viral culture.        There are no Patient Instructions on file for this visit.     Return in about 1 week (around 11/24/2022), or if symptoms worsen or fail to improve.    Pastora Pulido MD  [facility] MIRELLA Alvarado is a 37 year old, presenting for the following health issues:  Sick      History of Present Illness       Reason for visit:  Not feeling well  Symptom onset:  1-3 days ago  Symptoms include:  Cough, congestion, rib pain with cough, fatigue, chills, sweats    She eats 0-1 servings of fruits and vegetables daily.She consumes 1 sweetened beverage(s) daily.She exercises with enough effort to increase her heart rate 30 to 60 minutes per day.  She exercises with enough effort to increase her heart rate 3 or less days per week.   She is taking medications regularly.             Review of Systems   CONSTITUTIONAL: fatigue and fever.   ENT/MOUTH: NEGATIVE for ear, mouth and throat problems  RESP: dry cough  CV: NEGATIVE for chest pain, palpitations or peripheral edema      Objective    BP (!) 98/6   Pulse (!) 129   Temp 97.9  F (36.6  C) (Tympanic)   Resp 20   Ht 1.523 m (4' 11.96\")   Wt 52.6 kg (116 lb)   SpO2 97%   BMI 22.68 kg/m    Body mass index is 22.68 kg/m .  Physical Exam   GENERAL:  alert and no distress, appears tired.   EYES: Eyes grossly normal to inspection, conjunctivae and sclerae normal  RESP: Lungs clear to auscultation - no rales, rhonchi or wheezes  CV: , normal S1 S2, no murmur  MS: No gross musculoskeletal defects noted, no edema  NEURO: Normal strength and tone, mentation intact and " speech normal  PSYCH: Mentation appears normal, affect normal/bright       Pastora Pulido MD

## 2023-04-23 ENCOUNTER — HEALTH MAINTENANCE LETTER (OUTPATIENT)
Age: 38
End: 2023-04-23

## 2023-05-19 ENCOUNTER — MYC MEDICAL ADVICE (OUTPATIENT)
Dept: FAMILY MEDICINE | Facility: CLINIC | Age: 38
End: 2023-05-19

## 2023-05-19 ENCOUNTER — TELEPHONE (OUTPATIENT)
Dept: FAMILY MEDICINE | Facility: CLINIC | Age: 38
End: 2023-05-19

## 2023-05-19 ENCOUNTER — OFFICE VISIT (OUTPATIENT)
Dept: FAMILY MEDICINE | Facility: CLINIC | Age: 38
End: 2023-05-19
Payer: COMMERCIAL

## 2023-05-19 VITALS
RESPIRATION RATE: 20 BRPM | DIASTOLIC BLOOD PRESSURE: 78 MMHG | OXYGEN SATURATION: 100 % | HEIGHT: 60 IN | BODY MASS INDEX: 23.8 KG/M2 | WEIGHT: 121.2 LBS | SYSTOLIC BLOOD PRESSURE: 102 MMHG | TEMPERATURE: 97.5 F | HEART RATE: 64 BPM

## 2023-05-19 DIAGNOSIS — R10.9 RIGHT FLANK PAIN: ICD-10-CM

## 2023-05-19 DIAGNOSIS — R10.2 SUPRAPUBIC PAIN: ICD-10-CM

## 2023-05-19 DIAGNOSIS — Z87.442 HISTORY OF KIDNEY STONES: ICD-10-CM

## 2023-05-19 DIAGNOSIS — Z87.442 HISTORY OF KIDNEY STONES: Primary | ICD-10-CM

## 2023-05-19 DIAGNOSIS — R10.9 RIGHT FLANK PAIN: Primary | ICD-10-CM

## 2023-05-19 DIAGNOSIS — R53.81 MALAISE: ICD-10-CM

## 2023-05-19 LAB
ALBUMIN UR-MCNC: NEGATIVE MG/DL
ANION GAP SERPL CALCULATED.3IONS-SCNC: 6 MMOL/L (ref 3–14)
APPEARANCE UR: CLEAR
BACTERIA #/AREA URNS HPF: ABNORMAL /HPF
BILIRUB UR QL STRIP: NEGATIVE
BUN SERPL-MCNC: 14 MG/DL (ref 7–30)
CALCIUM SERPL-MCNC: 8.8 MG/DL (ref 8.5–10.1)
CHLORIDE BLD-SCNC: 109 MMOL/L (ref 94–109)
CLUE CELLS: ABNORMAL
CO2 SERPL-SCNC: 26 MMOL/L (ref 20–32)
COLOR UR AUTO: YELLOW
CREAT SERPL-MCNC: 0.58 MG/DL (ref 0.52–1.04)
ERYTHROCYTE [DISTWIDTH] IN BLOOD BY AUTOMATED COUNT: 12 % (ref 10–15)
GFR SERPL CREATININE-BSD FRML MDRD: >90 ML/MIN/1.73M2
GLUCOSE BLD-MCNC: 92 MG/DL (ref 70–99)
GLUCOSE UR STRIP-MCNC: NEGATIVE MG/DL
HCG UR QL: NEGATIVE
HCT VFR BLD AUTO: 41.9 % (ref 35–47)
HGB BLD-MCNC: 13.4 G/DL (ref 11.7–15.7)
HGB UR QL STRIP: ABNORMAL
KETONES UR STRIP-MCNC: ABNORMAL MG/DL
LEUKOCYTE ESTERASE UR QL STRIP: NEGATIVE
MCH RBC QN AUTO: 32.2 PG (ref 26.5–33)
MCHC RBC AUTO-ENTMCNC: 32 G/DL (ref 31.5–36.5)
MCV RBC AUTO: 101 FL (ref 78–100)
MUCOUS THREADS #/AREA URNS LPF: PRESENT /LPF
NITRATE UR QL: NEGATIVE
PH UR STRIP: 6 [PH] (ref 5–7)
PLATELET # BLD AUTO: 372 10E3/UL (ref 150–450)
POTASSIUM BLD-SCNC: 4 MMOL/L (ref 3.4–5.3)
RBC # BLD AUTO: 4.16 10E6/UL (ref 3.8–5.2)
RBC #/AREA URNS AUTO: ABNORMAL /HPF
SODIUM SERPL-SCNC: 141 MMOL/L (ref 133–144)
SP GR UR STRIP: >=1.03 (ref 1–1.03)
SQUAMOUS #/AREA URNS AUTO: ABNORMAL /LPF
TRICHOMONAS, WET PREP: ABNORMAL
UROBILINOGEN UR STRIP-ACNC: 1 E.U./DL
WBC # BLD AUTO: 6.6 10E3/UL (ref 4–11)
WBC #/AREA URNS AUTO: ABNORMAL /HPF
WBC'S/HIGH POWER FIELD, WET PREP: ABNORMAL
YEAST, WET PREP: ABNORMAL

## 2023-05-19 PROCEDURE — 80048 BASIC METABOLIC PNL TOTAL CA: CPT | Performed by: PHYSICIAN ASSISTANT

## 2023-05-19 PROCEDURE — 87491 CHLMYD TRACH DNA AMP PROBE: CPT | Performed by: PHYSICIAN ASSISTANT

## 2023-05-19 PROCEDURE — 85027 COMPLETE CBC AUTOMATED: CPT | Performed by: PHYSICIAN ASSISTANT

## 2023-05-19 PROCEDURE — 87210 SMEAR WET MOUNT SALINE/INK: CPT | Performed by: PHYSICIAN ASSISTANT

## 2023-05-19 PROCEDURE — 81025 URINE PREGNANCY TEST: CPT | Performed by: PHYSICIAN ASSISTANT

## 2023-05-19 PROCEDURE — 81001 URINALYSIS AUTO W/SCOPE: CPT | Performed by: PHYSICIAN ASSISTANT

## 2023-05-19 PROCEDURE — 99214 OFFICE O/P EST MOD 30 MIN: CPT | Performed by: PHYSICIAN ASSISTANT

## 2023-05-19 PROCEDURE — 87591 N.GONORRHOEAE DNA AMP PROB: CPT | Performed by: PHYSICIAN ASSISTANT

## 2023-05-19 PROCEDURE — 36415 COLL VENOUS BLD VENIPUNCTURE: CPT | Performed by: PHYSICIAN ASSISTANT

## 2023-05-19 ASSESSMENT — ENCOUNTER SYMPTOMS
DYSURIA: 0
FLANK PAIN: 1
FEVER: 0
CHILLS: 0
DIAPHORESIS: 0
ABDOMINAL PAIN: 1
FREQUENCY: 0
SHORTNESS OF BREATH: 0
COUGH: 0
NAUSEA: 0
VOMITING: 0

## 2023-05-19 ASSESSMENT — PAIN SCALES - GENERAL: PAINLEVEL: SEVERE PAIN (7)

## 2023-05-19 NOTE — PROGRESS NOTES
Assessment & Plan     History of kidney stones  Malaise  Suprapubic pain  Right flank pain  Patient is a 38-year-old female who presents to clinic due to generally feeling unwell, suprapubic pain, right flank pain, and significant history of kidney stones.  Vital signs normal.  Physical exam significant for bilateral CVA tenderness.  Patient was seen in urgent care was negative for UTI.  Differential diagnosis includes nephrolithiasis, hydronephrosis, STI, pregnancy, diverticulitis.  Will complete lab work for further evaluation.  We will base imaging needs on lab results.  Return and urgent follow-up precautions provided.  - CBC with platelets; Future  - UA Macroscopic with reflex to Microscopic and Culture; Future  - HCG Qual, Urine ([id number]); Future  - Chlamydia trachomatis PCR; Future  - Neisseria gonorrhoeae PCR; Future  - Wet prep - lab collect; Future  - Basic metabolic panel  (Ca, Cl, CO2, Creat, Gluc, K, Na, BUN); Future       See Patient Instructions    Dede Kiran PA-C  [facility] MIRELLA Alvarado is a 38 year old, presenting for the following health issues:  RECHECK (Patient went to  yesterday for Flank Pain, Pt has history of kidney stones.)        5/19/2023    12:59 PM   Additional Questions   Roomed by Veronica MCDONNELL MA   Accompanied by No one         5/19/2023    12:59 PM   Patient Reported Additional Medications   Patient reports taking the following new medications None     History of Present Illness       Reason for visit:  Pain in the upper right back /Period -blood in urine  Symptom onset:  1-3 days ago    She eats 0-1 servings of fruits and vegetables daily.She consumes 0 sweetened beverage(s) daily.She exercises with enough effort to increase her heart rate 30 to 60 minutes per day.  She exercises with enough effort to increase her heart rate 4 days per week.   She is taking medications regularly.     ABDOMINAL PAIN (and/or Flank Pain)    Onset: 4 day(s)  ago    Description:   Location: right flank  Radiation: Back and Pelvic region  Character: Dull ache, Cramping and throbbing    Frequency (if intermittent):not applicable        Pain-free between episodes: YES    History:    Previous similar pain? YES- When pt had kidney stones   Previous tests done? UA done yesterday  Any related trauma?: no    Accompanying Signs & Symptoms:   Fever? No  Nausea YES  Vomiting (bloody?, coffee-grounds?) no  Dysuria? No  Hematuria: No  Change in stool color: no  Change in stool pattern: No  Weight loss: No  **PATIENT STATED SHE DID HAVE BLOOD IN URINE ON MONDAY    Precipitating and/or Alleviating factors:   Does the pain change with: Food No                                                BM No                                                Body movement YES- When patient is up right it hurts more then when she is lying down                                                 Urination No  Possibility of Pregnancy: No ; Patient's last menstrual period was 05/16/2023 (approximate).    Therapies tried and outcome: NSAID's with  minor relief          Patient notes she generally feels unwell which started 4 days ago. She has been experiencing normal menstrual cramping, but also has suprapubic pain that wraps to the right side. Last kidney stone was a few years ago. Menstrual cycle can skip a month and then can be heavy when it occurs. Patient is not on birth control. Patient is using tampons.  Patient notes that she has had to see urology in past due to larger stones.  Imaging in 2019 shows hydronephrosis and renal calculus.     Per chart review, patient evaluated in urgent care due to abdominal cramping and pain.  Patient also recently started menstrual cycle and has history of kidney stones.  UA negative for hematuria and UTI.  Urgent care recommended follow-up with PCP for urine microscopy, UPT, CBC, and wet prep.        Review of Systems   Constitutional: Negative for chills, diaphoresis  "and fever.   Respiratory: Negative for cough and shortness of breath.    Cardiovascular: Negative for chest pain.   Gastrointestinal: Positive for abdominal pain. Negative for nausea and vomiting.   Genitourinary: Positive for flank pain. Negative for dysuria, frequency, urgency and vaginal discharge.            Objective    /78   Pulse 64   Temp 97.5  F (36.4  C) (Temporal)   Resp 20   Ht 1.525 m (5' 0.04\")   Wt 55 kg (121 lb 3.2 oz)   LMP 05/16/2023 (Approximate)   SpO2 100%   BMI 23.64 kg/m    Body mass index is 23.64 kg/m .  Physical Exam  Vitals and nursing note reviewed.   Constitutional:       General: She is not in acute distress.     Appearance: Normal appearance.   HENT:      Head: Normocephalic and atraumatic.   Eyes:      Extraocular Movements: Extraocular movements intact.      Pupils: Pupils are equal, round, and reactive to light.   Cardiovascular:      Rate and Rhythm: Normal rate and regular rhythm.      Heart sounds: Normal heart sounds.   Pulmonary:      Effort: Pulmonary effort is normal.      Breath sounds: Normal breath sounds.   Abdominal:      General: Bowel sounds are normal.      Palpations: Abdomen is soft.      Tenderness: There is no abdominal tenderness. There is right CVA tenderness and left CVA tenderness. There is no guarding.   Musculoskeletal:         General: Normal range of motion.      Cervical back: Normal range of motion.   Skin:     General: Skin is warm and dry.   Neurological:      General: No focal deficit present.      Mental Status: She is alert.   Psychiatric:         Mood and Affect: Mood normal.         Behavior: Behavior normal.                          "

## 2023-05-19 NOTE — PATIENT INSTRUCTIONS
For further evaluation of your abdominal and flank pain we are completing lab work.  We will send all lab results to [facility] as well as next steps.  If you develop any severe symptoms such as severe abdominal pain, fevers, vomiting due to pain, or any other severe symptoms, please go to the emergency department for more rapid evaluation and treatment.  Reach out with questions or concerns.

## 2023-05-19 NOTE — TELEPHONE ENCOUNTER
Patient calling    She went to  yesterday for flank pain and some blood in urine. She is on menstrual cycle right now and she was told previously that she might have some early menopause symptoms.    She has hx of kidney stones    She wants to be assessed further with a female provider    RN advised when to go to ED. Patient verbalized understanding    Alejandra Daniel RN

## 2023-05-20 LAB
C TRACH DNA SPEC QL NAA+PROBE: NEGATIVE
N GONORRHOEA DNA SPEC QL NAA+PROBE: NEGATIVE

## 2023-05-22 NOTE — TELEPHONE ENCOUNTER
See [employer] message 5/19/23.    Pt seen 5/19/23 with Dede Kiran and responding to advice given at appointment.     Irma Orosco RN

## 2023-06-02 ENCOUNTER — HEALTH MAINTENANCE LETTER (OUTPATIENT)
Age: 38
End: 2023-06-02

## 2023-07-31 ENCOUNTER — NURSE TRIAGE (OUTPATIENT)
Dept: FAMILY MEDICINE | Facility: CLINIC | Age: 38
End: 2023-07-31

## 2023-07-31 ENCOUNTER — OFFICE VISIT (OUTPATIENT)
Dept: FAMILY MEDICINE | Facility: CLINIC | Age: 38
End: 2023-07-31
Payer: COMMERCIAL

## 2023-07-31 VITALS
DIASTOLIC BLOOD PRESSURE: 78 MMHG | HEIGHT: 60 IN | SYSTOLIC BLOOD PRESSURE: 112 MMHG | HEART RATE: 69 BPM | RESPIRATION RATE: 22 BRPM | OXYGEN SATURATION: 99 % | BODY MASS INDEX: 23.29 KG/M2 | TEMPERATURE: 97.6 F | WEIGHT: 118.6 LBS

## 2023-07-31 DIAGNOSIS — N93.9 ABNORMAL UTERINE BLEEDING (AUB): Primary | ICD-10-CM

## 2023-07-31 LAB — HCG UR QL: NEGATIVE

## 2023-07-31 PROCEDURE — 81025 URINE PREGNANCY TEST: CPT | Performed by: PHYSICIAN ASSISTANT

## 2023-07-31 PROCEDURE — 99213 OFFICE O/P EST LOW 20 MIN: CPT | Performed by: PHYSICIAN ASSISTANT

## 2023-07-31 ASSESSMENT — ENCOUNTER SYMPTOMS
VOMITING: 0
ABDOMINAL PAIN: 1
DIAPHORESIS: 0
FATIGUE: 1
DYSURIA: 0
HEMATURIA: 0
NAUSEA: 1
FEVER: 0

## 2023-07-31 ASSESSMENT — PAIN SCALES - GENERAL: PAINLEVEL: SEVERE PAIN (6)

## 2023-07-31 NOTE — PROGRESS NOTES
Assessment & Plan     Abnormal uterine bleeding (AUB)  Patient is a 38-year-old female who presents to clinic due to missed menstrual cycle last month after having unprotected sex.  She developed vaginal bleeding and clotting starting yesterday and is concerned for a pregnancy or miscarriage.  Home pregnancy test negative.  Vital signs normal.  Physical exam significant for mild suprapubic tenderness to palpation.    We will complete urine pregnancy testing.  Discussed that abnormal menstrual cycle may also be related to hormonal changes or stress.  If menstrual cycle does not normalize over the next 1-2 months, or patient develops an increase in AUB, would consider ultrasound for further evaluation.  Discussed options for birth control.  Patient is interested in IUD.  Patient scheduled to meet with provider for IUD consult.  Return/follow-up precautions provided.  - HCG Qual, Urine ([id number]); Future  - HCG Qual, Urine ([id number])         See Patient Instructions    Dede Kiran PA-C  [facility] MIRELLA Alvarado is a 38 year old, presenting for the following health issues:  Abnormal Bleeding Problem (Patient state she was having some pain in her right side that travels down her right thigh. She stated she did have unprotected sex and is unsure if she was pregnant and is having a miscarriage. There is a lot of clotting that is unusual for her. Started really dark and is now very bright red. Patient stated that she was late so took a test and it was negative but that was after spotting stated. )      7/31/2023    11:08 AM   Additional Questions   Roomed by Veronica MCDONNELL MA   Accompanied by No one         7/31/2023    11:08 AM   Patient Reported Additional Medications   Patient reports taking the following new medications None       History of Present Illness       Reason for visit:  Unnormal bleeding cramps  Symptom onset:  1-3 days ago    She eats 2-3 servings of fruits and vegetables daily.She  "consumes 1 sweetened beverage(s) daily.She exercises with enough effort to increase her heart rate 30 to 60 minutes per day.  She exercises with enough effort to increase her heart rate 5 days per week.   She is taking medications regularly.       Patient notes that she missed her cycle last month. Her last normal cycle was 2 months ago. Patient is not on birth control and had unprotected sex. She started having bleeding and passing clots yesterday which is abnormal for her. No concerns for STI.       Review of Systems   Constitutional:  Positive for fatigue. Negative for diaphoresis and fever.   Gastrointestinal:  Positive for abdominal pain and nausea. Negative for vomiting.   Breasts:  Positive for tenderness.   Genitourinary:  Positive for menstrual problem, vaginal bleeding and vaginal discharge. Negative for dysuria and hematuria.            Objective    /78   Pulse 69   Temp 97.6  F (36.4  C) (Temporal)   Resp 22   Ht 1.53 m (5' 0.24\")   Wt 53.8 kg (118 lb 9.6 oz)   LMP  (LMP Unknown)   SpO2 99%   Breastfeeding No   BMI 22.98 kg/m    Body mass index is 22.98 kg/m .  Physical Exam  Vitals and nursing note reviewed.   Constitutional:       General: She is not in acute distress.     Appearance: Normal appearance.   HENT:      Head: Normocephalic and atraumatic.   Eyes:      Extraocular Movements: Extraocular movements intact.      Pupils: Pupils are equal, round, and reactive to light.   Cardiovascular:      Rate and Rhythm: Normal rate and regular rhythm.      Heart sounds: Normal heart sounds.   Pulmonary:      Effort: Pulmonary effort is normal.      Breath sounds: Normal breath sounds.   Abdominal:      General: Bowel sounds are normal.      Palpations: Abdomen is soft.      Tenderness: There is abdominal tenderness (Mild, suprapubic/right). There is no guarding or rebound.   Musculoskeletal:         General: Normal range of motion.      Cervical back: Normal range of motion.   Skin:     " General: Skin is warm and dry.   Neurological:      General: No focal deficit present.      Mental Status: She is alert.   Psychiatric:         Mood and Affect: Mood normal.         Behavior: Behavior normal.

## 2023-07-31 NOTE — TELEPHONE ENCOUNTER
"S-(situation): has not had a period for a few months ( end of May) has had unprotected sex a few times. She is now bleeding and this is not her \"normal\" bleeding - more bright red than she has cramping that goes into the right thigh, has been nauseated too     B-(background): normally does not have periods regularly, has had unprotected sex  - did take a pregnancy test last night - it was negative     A-(assessment): bleeding - has been bright red and passing clots a few times not each time. Did pass some grey color last night  has felt lightheaded but is able to do normal activities.       R-(recommendations): will route to her care team for further advice    RN advised pt that she should drink fluids and do BRAT diet and wait for her PCP office to call for further direction    Routing to [facility] [place] # [phone] [facility]     Reason for Disposition   Passed tissue (e.g., gray-white)    Additional Information   Negative: SEVERE vaginal bleeding (e.g., continuous red blood from vagina, or large blood clots) and very weak (can't stand)   Negative: Passed out (i.e., fainted, collapsed and was not responding)   Negative: Difficult to awaken or acting confused (e.g., disoriented, slurred speech)   Negative: Shock suspected (e.g., cold/pale/clammy skin, too weak to stand, low BP, rapid pulse)   Negative: Sounds like a life-threatening emergency to the triager   Negative: Pregnant 20 or more weeks (5 months or more)   Negative: Pregnant < 20 weeks (less than 5 months)   Negative: Postpartum (from 0 to 6 weeks after delivery)   Negative: Vaginal discharge is main symptom and bleeding is slight   Negative: SEVERE abdominal pain (e.g., excruciating)   Negative: SEVERE dizziness (e.g., unable to stand, requires support to walk, feels like passing out now)   Negative: Taking Coumadin (warfarin) or other strong blood thinner, or known bleeding disorder (e.g., thrombocytopenia)   Negative: SEVERE vaginal bleeding (e.g., " "soaking 2 pads or tampons per hour and present 2 or more hours; 1 menstrual cup every 2 hours)   Negative: MODERATE vaginal bleeding (i.e., soaking pad or tampon per hour and present > 6 hours; 1 menstrual cup every 6 hours)   Negative: Pale skin (pallor) of new-onset or worsening   Negative: Constant abdominal pain lasting > 2 hours   Negative: Patient sounds very sick or weak to the triager   Negative: Skin bruises or nosebleed and not caused by an injury   Negative: Bleeding/spotting after procedure (e.g., biopsy) or pelvic examination (e.g., pap smear) that persists > 3 days   Negative: Patient wants to be seen    Answer Assessment - Initial Assessment Questions  1. AMOUNT: \"Describe the bleeding that you are having.\"     - SPOTTING: spotting, or pinkish / brownish mucous discharge; does not fill panty liner or pad     - MILD:  less than 1 pad / hour; less than patient's usual menstrual bleeding    - MODERATE: 1-2 pads / hour; 1 menstrual cup every 6 hours; small-medium blood clots (e.g., pea, grape, small coin)    - SEVERE: soaking 2 or more pads/hour for 2 or more hours; 1 menstrual cup every 2 hours; bleeding not contained by pads or continuous red blood from vagina; large blood clots (e.g., golf ball, large coin)       Mild - was brown, then pinkish, and now its bright red and it is passing clots that have been the size of a quarter size up to a 50 cent piece     2. ONSET: \"When did the bleeding begin?\" \"Is it continuing now?\"      Yesterday  - 7/30/2023  - and it is ongoing     3. MENSTRUAL PERIOD: \"When was the last normal menstrual period?\" \"How is this different than your period?\"      Normal one was the end of may start of June - light bleeding and it lasted for 3 days     4. REGULARITY: \"How regular are your periods?\"      They come and go its a every few months she will get a peroid     5. ABDOMINAL PAIN: \"Do you have any pain?\" \"How bad is the pain?\"  (e.g., Scale 1-10; mild, moderate, or severe)    " "- MILD (1-3): doesn't interfere with normal activities, abdomen soft and not tender to touch     - MODERATE (4-7): interferes with normal activities or awakens from sleep, abdomen tender to touch     - SEVERE (8-10): excruciating pain, doubled over, unable to do any normal activities       Moderate     6. PREGNANCY: \"Could you be pregnant?\" \"Are you sexually active?\" \"Did you recently give birth?\"      She has been having un protected sex     7. BREASTFEEDING: \"Are you breastfeeding?\"      None     8. HORMONES: \"Are you taking any hormone medications, prescription or OTC?\" (e.g., birth control pills, estrogen)      None     9. BLOOD THINNERS: \"Do you take any blood thinners?\" (e.g., Coumadin/warfarin, Pradaxa/dabigatran, aspirin)      None     10. CAUSE: \"What do you think is causing the bleeding?\" (e.g., recent gyn surgery, recent gyn procedure; known bleeding disorder, cervical cancer, polycystic ovarian disease, fibroids)          Unknown - this is not her normal peroid     11. HEMODYNAMIC STATUS: \"Are you weak or feeling lightheaded?\" If Yes, ask: \"Can you stand and walk normally?\"         Feels weak and lightheaded at times   Able to walk and stand normally     12. OTHER SYMPTOMS: \"What other symptoms are you having with the bleeding?\" (e.g., passed tissue, vaginal discharge, fever, menstrual-type cramps)        Cramping that goes into the right thigh.    Protocols used: Vaginal Bleeding - Qaxcloba-I-ST    "

## 2023-07-31 NOTE — TELEPHONE ENCOUNTER
RN left message to return call to clinic [phone] (to get patient in with same day provider)    Carolina Berger RN on 7/31/2023 at 9:21 AM

## 2023-07-31 NOTE — PATIENT INSTRUCTIONS
For further evaluation of your abnormal uterine bleeding we are completing a urine pregnancy test.  We will send results to [facility].  Your cycles should normalize within the next few months.  If it does not, please reach out and we will complete an ultrasound at that point.    You have been scheduled for an IUD consult with Dnaa Abel PA-C.  Please reach out with any questions or concerns.

## 2023-07-31 NOTE — TELEPHONE ENCOUNTER
Pt returned call and scheduled to be seen 7/31/23 with same day provider to be further evaluated for abnormal vaginal bleeding.    Irma Orosco RN on 7/31/2023 at 9:53 AM

## 2023-07-31 NOTE — LETTER
July 31, 2023      Jenny Caldwell  [address] [facility] [address]        To Whom It May Concern:    Jenny Caldwell  was seen on 7/31/23.  Please excuse her  until 8/1/23 due to illness.        Sincerely,        Dede Kiran PA-C

## 2023-08-09 ENCOUNTER — VIRTUAL VISIT (OUTPATIENT)
Dept: FAMILY MEDICINE | Facility: CLINIC | Age: 38
End: 2023-08-09
Payer: COMMERCIAL

## 2023-08-09 DIAGNOSIS — Z30.09 ENCOUNTER FOR GENERAL COUNSELING AND ADVICE ON CONTRACEPTIVE MANAGEMENT: Primary | ICD-10-CM

## 2023-08-09 PROCEDURE — 99214 OFFICE O/P EST MOD 30 MIN: CPT | Mod: VID | Performed by: PHYSICIAN ASSISTANT

## 2023-08-09 NOTE — PROGRESS NOTES
"Jenny is a 38 year old who is being evaluated via a billable video visit.      How would you like to obtain your AVS? MyChart  If the video visit is dropped, the invitation should be resent by: Text to cell phone: [phone]  Will anyone else be joining your video visit? No          Assessment & Plan     Encounter for general counseling and advice on contraceptive management  I discussed the risks, benefits and details regarding IUD's, including the risk of possible uterine perforation, PID (which typically occurs in <1% of users), ectopic pregnancy, potential for \"lost\" IUD strings, and menstrual changes.   Reviewed what to expect during placement of the IUD.  Will want to ensure she is not pregnant prior to insertion, will check a urine pregnancy test prior and avoid unprotected intercourse prior to insertion.  Will make an appointment in the future for placement menses (ideally 4-7 days after start of menses).     I recommend 600 mg ibuprofen prior to the procedure.     I reviewed the pro's and con's of both the Copper IUD and Mirena IUD.     Ok to schedule insertion.           22 minutes spent by me on the date of the encounter doing chart review, history and exam, documentation and further activities per the note           IVAN [facility] MIRELLA Alvarado is a 38 year old, presenting for the following health issues:  Consult      HPI     Patient arrived for IUD consult but is open to discuss other options.     She has never had an IUD before, has taken pill form of contraceptive in the past but is currently not taking anything. Last menstrual cycle was approximately 1 week ago. Feels her cycles have been irregular over the last year, they usually come late, flow heaviness is intermittent.     Has difficulty remembering to take OCPs            Review of Systems   Constitutional, HEENT, cardiovascular, pulmonary, gi and gu systems are negative, except as otherwise " noted.      Objective           Vitals:  No vitals were obtained today due to virtual visit.    Physical Exam   GENERAL: Healthy, alert and no distress  EYES: Eyes grossly normal to inspection.  No discharge or erythema, or obvious scleral/conjunctival abnormalities.  RESP: No audible wheeze, cough, or visible cyanosis.  No visible retractions or increased work of breathing.    SKIN: Visible skin clear. No significant rash, abnormal pigmentation or lesions.  NEURO: Cranial nerves grossly intact.  Mentation and speech appropriate for age.  PSYCH: Mentation appears normal, affect normal/bright, judgement and insight intact, normal speech and appearance well-groomed.                Video-Visit Details    Type of service:  Video Visit   Video Start Time: 9:28 AM  Video End Time:9:43 AM    Originating Location (pt. Location): Home    Distant Location (provider location):  On-site  Platform used for Video Visit: Paula

## 2023-08-09 NOTE — PATIENT INSTRUCTIONS
Take 600 mg ibuprofen before you leave home.    Will check a urine pregnancy test when you first arrive.

## 2023-08-23 ENCOUNTER — TELEPHONE (OUTPATIENT)
Dept: FAMILY MEDICINE | Facility: CLINIC | Age: 38
End: 2023-08-23

## 2023-08-23 NOTE — TELEPHONE ENCOUNTER
This writer attempted to contact Jenny on 08/23/23      Reason for call Schedule IUD Placement and left message.      If patient calls back:   Schedule IUD Placement appointment with Dana Abel, document that pt called and close encounter .        Maggi Goode MA

## 2024-02-11 ENCOUNTER — HEALTH MAINTENANCE LETTER (OUTPATIENT)
Age: 39
End: 2024-02-11

## 2024-03-14 ENCOUNTER — TELEPHONE (OUTPATIENT)
Dept: FAMILY MEDICINE | Facility: CLINIC | Age: 39
End: 2024-03-14

## 2024-03-14 NOTE — TELEPHONE ENCOUNTER
Patient Quality Outreach    Patient is due for the following:   Physical Preventive Adult Physical    Next Steps:   Schedule a Adult Preventative    Type of outreach:    Sent [employer] message.      Questions for provider review:    None           Maggi Goode MA  Chart routed to Care Team.

## 2024-05-20 ENCOUNTER — MYC MEDICAL ADVICE (OUTPATIENT)
Dept: FAMILY MEDICINE | Facility: CLINIC | Age: 39
End: 2024-05-20

## 2024-05-21 NOTE — TELEPHONE ENCOUNTER
Please  help patient schedule an appointment this week.    Heather CATALANN RN  Triage Nurse  [facility]

## 2024-05-23 NOTE — TELEPHONE ENCOUNTER
STEPHANIEM to schedule.    Irma Arias   Lead    [facility] Theresa Malone

## 2024-05-29 ENCOUNTER — OFFICE VISIT (OUTPATIENT)
Dept: FAMILY MEDICINE | Facility: CLINIC | Age: 39
End: 2024-05-29

## 2024-05-29 VITALS
SYSTOLIC BLOOD PRESSURE: 128 MMHG | DIASTOLIC BLOOD PRESSURE: 77 MMHG | BODY MASS INDEX: 23.44 KG/M2 | HEIGHT: 60 IN | RESPIRATION RATE: 20 BRPM | TEMPERATURE: 98.5 F | HEART RATE: 70 BPM | WEIGHT: 119.4 LBS | OXYGEN SATURATION: 100 %

## 2024-05-29 DIAGNOSIS — N64.52 BLOODY DISCHARGE FROM RIGHT NIPPLE: Primary | ICD-10-CM

## 2024-05-29 DIAGNOSIS — N92.1 MENORRHAGIA WITH IRREGULAR CYCLE: ICD-10-CM

## 2024-05-29 LAB
BASOPHILS # BLD AUTO: 0 10E3/UL (ref 0–0.2)
BASOPHILS NFR BLD AUTO: 1 %
EOSINOPHIL # BLD AUTO: 0 10E3/UL (ref 0–0.7)
EOSINOPHIL NFR BLD AUTO: 1 %
ERYTHROCYTE [DISTWIDTH] IN BLOOD BY AUTOMATED COUNT: 12.1 % (ref 10–15)
HCT VFR BLD AUTO: 41.2 % (ref 35–47)
HGB BLD-MCNC: 13.3 G/DL (ref 11.7–15.7)
IMM GRANULOCYTES # BLD: 0 10E3/UL
IMM GRANULOCYTES NFR BLD: 0 %
LYMPHOCYTES # BLD AUTO: 1.9 10E3/UL (ref 0.8–5.3)
LYMPHOCYTES NFR BLD AUTO: 33 %
MCH RBC QN AUTO: 32.2 PG (ref 26.5–33)
MCHC RBC AUTO-ENTMCNC: 32.3 G/DL (ref 31.5–36.5)
MCV RBC AUTO: 100 FL (ref 78–100)
MONOCYTES # BLD AUTO: 0.8 10E3/UL (ref 0–1.3)
MONOCYTES NFR BLD AUTO: 14 %
NEUTROPHILS # BLD AUTO: 2.9 10E3/UL (ref 1.6–8.3)
NEUTROPHILS NFR BLD AUTO: 52 %
PLATELET # BLD AUTO: 345 10E3/UL (ref 150–450)
RBC # BLD AUTO: 4.13 10E6/UL (ref 3.8–5.2)
WBC # BLD AUTO: 5.7 10E3/UL (ref 4–11)

## 2024-05-29 PROCEDURE — 84443 ASSAY THYROID STIM HORMONE: CPT | Performed by: NURSE PRACTITIONER

## 2024-05-29 PROCEDURE — 99214 OFFICE O/P EST MOD 30 MIN: CPT | Performed by: NURSE PRACTITIONER

## 2024-05-29 PROCEDURE — 84146 ASSAY OF PROLACTIN: CPT | Performed by: NURSE PRACTITIONER

## 2024-05-29 PROCEDURE — 82728 ASSAY OF FERRITIN: CPT | Performed by: NURSE PRACTITIONER

## 2024-05-29 PROCEDURE — 85025 COMPLETE CBC W/AUTO DIFF WBC: CPT | Performed by: NURSE PRACTITIONER

## 2024-05-29 PROCEDURE — 36415 COLL VENOUS BLD VENIPUNCTURE: CPT | Performed by: NURSE PRACTITIONER

## 2024-05-29 ASSESSMENT — PAIN SCALES - GENERAL: PAINLEVEL: NO PAIN (0)

## 2024-05-29 NOTE — PROGRESS NOTES
Assessment & Plan     Bloody discharge from right nipple  -Differentials considered include mastitis/abscess, papilloma, ductal atresia, malignancy, or skin irritation. Due to irregular periods also reorted today will also screen for hyperprolactinemia. no drainage or signs of abscess/infection on exam today. No mass appreciated on exam. Due to age and family history (mother  of advanced breast cancer age 50)  - MA Diagnostic Digital Right; Future  - US Breast Right Limited 1-3 Quadrants; Future  - Prolactin; Future  - Prolactin    Menorrhagia with irregular cycle  -Periods have become very irregular, can skip several months. due to patient report of large amount of clots, soaking through clothes, recommend pelvic US. May benefit from Mirena IUD vs ablation. Could consider oral contraception as well but may want to avoid estrogen containing products due to history of breast cancer in mother.   - US Pelvic Complete with Transvaginal; Future  - CBC with Platelets & Differential  - TSH with free T4 reflex  - Ferritin  - Prolactin      Sharon Alvarado is a 39 year old, presenting for the following health issues:  Breast Pain        2024    11:41 AM   Additional Questions   Roomed by Netta ALMANZA   Accompanied by self     1.5 weeks noticed dried blood on right nipple.     Hasn't noticed any more drainage since.     Comes and goes breast pain around nipple.     No fevers or chills. Has felt more tired than normal. Periods more irregular-now getting it once every 3 months and it lasts only a couple of days, more clotting. No chance of pregnancy.    No history of lactation.     Mom had breast cancer  at age 50, diagnosed age 45 or 46.     History of Present Illness       Reason for visit:  Breast Pain/Blood    She eats 2-3 servings of fruits and vegetables daily.She consumes 1 sweetened beverage(s) daily.She exercises with enough effort to increase her heart rate 30 to 60 minutes per day.  She exercises with  "enough effort to increase her heart rate 3 or less days per week.   She is taking medications regularly.             Objective    /77   Pulse 70   Temp 98.5  F (36.9  C) (Tympanic)   Resp 20   Ht 1.53 m (5' 0.24\")   Wt 54.2 kg (119 lb 6.4 oz)   LMP 03/31/2024 (Approximate)   SpO2 100%   BMI 23.13 kg/m    Body mass index is 23.13 kg/m .  Physical Exam  Constitutional:       General: She is not in acute distress.     Appearance: Normal appearance.   HENT:      Right Ear: External ear normal.      Left Ear: External ear normal.   Eyes:      Pupils: Pupils are equal, round, and reactive to light.   Cardiovascular:      Rate and Rhythm: Normal rate.      Pulses: Normal pulses.      Heart sounds: Normal heart sounds. No murmur heard.     No friction rub. No gallop.   Pulmonary:      Effort: Pulmonary effort is normal. No respiratory distress.      Breath sounds: No wheezing, rhonchi or rales.   Chest:   Breasts:     Right: Tenderness present. No swelling, bleeding, inverted nipple, mass, nipple discharge or skin change.      Left: No swelling, bleeding, inverted nipple, mass, nipple discharge, skin change or tenderness.   Lymphadenopathy:      Upper Body:      Right upper body: No supraclavicular, axillary or pectoral adenopathy.      Left upper body: No supraclavicular, axillary or pectoral adenopathy.   Skin:     General: Skin is warm and dry.   Neurological:      General: No focal deficit present.      Mental Status: She is alert and oriented to person, place, and time.   Psychiatric:         Mood and Affect: Mood normal.         Behavior: Behavior normal.         Thought Content: Thought content normal.         Judgment: Judgment normal.                Signed Electronically by: KINSEY Gomez CNP    "

## 2024-05-30 LAB
FERRITIN SERPL-MCNC: 23 NG/ML (ref 6–175)
PROLACTIN SERPL 3RD IS-MCNC: 10 NG/ML (ref 5–23)
TSH SERPL DL<=0.005 MIU/L-ACNC: 0.92 UIU/ML (ref 0.3–4.2)

## 2024-06-30 ENCOUNTER — HEALTH MAINTENANCE LETTER (OUTPATIENT)
Age: 39
End: 2024-06-30

## 2024-11-12 ENCOUNTER — PATIENT OUTREACH (OUTPATIENT)
Dept: FAMILY MEDICINE | Facility: CLINIC | Age: 39
End: 2024-11-12

## 2025-07-13 ENCOUNTER — HEALTH MAINTENANCE LETTER (OUTPATIENT)
Age: 40
End: 2025-07-13

## (undated) DEVICE — PAD CHUX UNDERPAD 30X36" P3036C

## (undated) DEVICE — Device

## (undated) DEVICE — COVER CAMERA IN-LIGHT DISP LT-C02

## (undated) DEVICE — STRAP KNEE/BODY 31143004

## (undated) DEVICE — GLOVE SENSICARE 7.5 MSG1075 LATEX FREE

## (undated) DEVICE — LINEN TOWEL PACK X5 5464

## (undated) DEVICE — SUCTION CANNULA UTERINE 07MM CVD  21853

## (undated) DEVICE — TUBING SUCTION VACUUM COLLECTION 6FT 610

## (undated) DEVICE — GLOVE PROTEXIS MICRO 7.0  2D73PM70

## (undated) DEVICE — SOL NACL 0.9% IRRIG 1000ML BOTTLE 2F7124

## (undated) DEVICE — LINEN GOWN X4 5410

## (undated) DEVICE — SPECIMEN TRAP VACUUM SUCTION SAFETOUCH 003853-902

## (undated) DEVICE — SUCTION CANNULA UTERINE 08MM CVD  20317

## (undated) DEVICE — SUCTION VACUUM CANISTER STANDARD W/LID&CAPS 003987-901

## (undated) RX ORDER — FENTANYL CITRATE 50 UG/ML
INJECTION, SOLUTION INTRAMUSCULAR; INTRAVENOUS
Status: DISPENSED
Start: 2019-08-08

## (undated) RX ORDER — DOXYCYCLINE 100 MG/10ML
INJECTION, POWDER, LYOPHILIZED, FOR SOLUTION INTRAVENOUS
Status: DISPENSED
Start: 2019-08-08

## (undated) RX ORDER — LIDOCAINE HYDROCHLORIDE 20 MG/ML
JELLY TOPICAL
Status: DISPENSED
Start: 2019-08-08

## (undated) RX ORDER — LIDOCAINE HYDROCHLORIDE 10 MG/ML
INJECTION, SOLUTION EPIDURAL; INFILTRATION; INTRACAUDAL; PERINEURAL
Status: DISPENSED
Start: 2019-08-08

## (undated) RX ORDER — SCOLOPAMINE TRANSDERMAL SYSTEM 1 MG/1
PATCH, EXTENDED RELEASE TRANSDERMAL
Status: DISPENSED
Start: 2019-08-08